# Patient Record
Sex: FEMALE | Race: WHITE | NOT HISPANIC OR LATINO | Employment: OTHER | URBAN - METROPOLITAN AREA
[De-identification: names, ages, dates, MRNs, and addresses within clinical notes are randomized per-mention and may not be internally consistent; named-entity substitution may affect disease eponyms.]

---

## 2018-10-10 ENCOUNTER — HOSPITAL ENCOUNTER (OUTPATIENT)
Dept: ONCOLOGY | Facility: HOSPITAL | Age: 73
Discharge: HOME | End: 2018-10-10
Attending: INTERNAL MEDICINE | Admitting: INTERNAL MEDICINE

## 2018-10-10 ENCOUNTER — HOSPITAL ENCOUNTER (OUTPATIENT)
Dept: ONCOLOGY | Facility: CLINIC | Age: 73
Setting detail: INFUSION SERIES
Discharge: HOME | End: 2018-10-10
Attending: INTERNAL MEDICINE | Admitting: INTERNAL MEDICINE

## 2018-10-10 ENCOUNTER — CLINICAL SUPPORT (OUTPATIENT)
Dept: ONCOLOGY | Facility: HOSPITAL | Age: 73
End: 2018-10-10

## 2018-10-10 LAB
ALBUMIN SERPL-MCNC: 3.9 G/DL (ref 3.5–4.8)
ALBUMIN/GLOB SERPL: 1.3 {RATIO} (ref 1–1.7)
ALP SERPL-CCNC: 83 IU/L (ref 32–91)
ALT SERPL-CCNC: 12 IU/L (ref 14–54)
ANION GAP SERPL CALC-SCNC: 11.1 MMOL/L (ref 10–20)
AST SERPL-CCNC: 11 IU/L (ref 15–41)
BILIRUB SERPL-MCNC: 0.3 MG/DL (ref 0.3–1.2)
BUN SERPL-MCNC: 10 MG/DL (ref 8–20)
BUN/CREAT SERPL: 11.1 (ref 5.4–26.2)
CALCIUM SERPL-MCNC: 9.5 MG/DL (ref 8.9–10.3)
CHLORIDE SERPL-SCNC: 106 MMOL/L (ref 101–111)
CONV CO2: 26 MMOL/L (ref 22–32)
CONV TOTAL PROTEIN: 6.8 G/DL (ref 6.1–7.9)
CORTIS SERPL-MCNC: 2.3 UG/DL
CREAT UR-MCNC: 0.9 MG/DL (ref 0.4–1)
ERYTHROCYTE [SEDIMENTATION RATE] IN BLOOD BY WESTERGREN METHOD: 12 MM/HR (ref 0–30)
FERRITIN SERPL-MCNC: 39 NG/ML (ref 11–307)
GLOBULIN UR ELPH-MCNC: 2.9 G/DL (ref 2.5–3.8)
GLUCOSE SERPL-MCNC: 94 MG/DL (ref 65–99)
LDH SERPL-CCNC: 114 IU/L (ref 98–192)
POTASSIUM SERPL-SCNC: 4.1 MMOL/L (ref 3.6–5.1)
SODIUM SERPL-SCNC: 139 MMOL/L (ref 136–144)
T3FREE SERPL-MCNC: 3.14 PG/ML (ref 2.39–6.79)
T4 FREE SERPL-MCNC: 0.87 NG/DL (ref 0.58–1.64)

## 2018-10-10 NOTE — PROGRESS NOTES
PATIENTS ONCOLOGY RECORD LOCATED IN [facility]      Subjective     Name:  FRANCISCA MCKINLEY     Date:  10/10/2018  Address:  [address] [place] [address] [place] [facility]  Home: [phone]  :  1945 AGE:  73 y.o.        RECORDS OBTAINED:  Patients Oncology Record is located in [facility]

## 2018-10-12 LAB — ANA SER QL IA: POSITIVE

## 2018-10-16 ENCOUNTER — HOSPITAL ENCOUNTER (OUTPATIENT)
Dept: ONCOLOGY | Facility: CLINIC | Age: 73
Setting detail: INFUSION SERIES
Discharge: HOME | End: 2018-10-16
Attending: INTERNAL MEDICINE | Admitting: INTERNAL MEDICINE

## 2018-10-16 ENCOUNTER — CLINICAL SUPPORT (OUTPATIENT)
Dept: ONCOLOGY | Facility: HOSPITAL | Age: 73
End: 2018-10-16

## 2018-10-16 NOTE — PROGRESS NOTES
PATIENTS ONCOLOGY RECORD LOCATED IN [facility]      Subjective     Name:  FRANCISCA MCKINLEY     Date:  10/16/2018  Address:  [address] [place] [address] [place] [facility]  Home: [phone]  :  1945 AGE:  73 y.o.        RECORDS OBTAINED:  Patients Oncology Record is located in [facility]

## 2018-10-17 LAB
ANA PAT SER-IMP: NORMAL
ANA SER QL IA: POSITIVE
ANA TITR SER IF: NORMAL {TITER}
CENTROMERE B AB SER-ACNC: <1 AI
CHROMATIN AB: <1 AI
DSDNA AB SER-ACNC: <1 IU/ML
ENA JO1 AB SER-ACNC: <1 AI
ENA RNP AB SER-ACNC: <1 AI
ENA SCL70 AB SER QL IA: <1 AI
ENA SM AB SER-ACNC: <1 AI
ENA SS-B AB SER-ACNC: <1 AI
INTERPRETATION: NORMAL
RIBOSOMAL P AB SER-ACNC: <1 AI
RIBOSOMAL P AB SER-ACNC: <1 AI
SJOGREN'S ANTI-SS-A: <1 AI

## 2018-11-20 ENCOUNTER — CLINICAL SUPPORT (OUTPATIENT)
Dept: ONCOLOGY | Facility: HOSPITAL | Age: 73
End: 2018-11-20

## 2018-11-20 ENCOUNTER — HOSPITAL ENCOUNTER (OUTPATIENT)
Dept: ONCOLOGY | Facility: CLINIC | Age: 73
Setting detail: INFUSION SERIES
Discharge: HOME | End: 2018-11-20
Attending: INTERNAL MEDICINE | Admitting: INTERNAL MEDICINE

## 2018-11-20 NOTE — PROGRESS NOTES
PATIENTS ONCOLOGY RECORD LOCATED IN [facility]      Subjective     Name:  FRANCISCA MCKINLEY     Date:  2018  Address:  [address] [place] [address] [place] [facility]  Home: [unfilled]  :  1945 AGE:  73 y.o.        RECORDS OBTAINED:  Patients Oncology Record is located in [facility]

## 2018-12-14 ENCOUNTER — HOSPITAL ENCOUNTER (OUTPATIENT)
Dept: ONCOLOGY | Facility: CLINIC | Age: 73
Setting detail: INFUSION SERIES
Discharge: HOME | End: 2018-12-14
Attending: INTERNAL MEDICINE | Admitting: INTERNAL MEDICINE

## 2018-12-14 ENCOUNTER — CLINICAL SUPPORT (OUTPATIENT)
Dept: ONCOLOGY | Facility: HOSPITAL | Age: 73
End: 2018-12-14

## 2018-12-14 NOTE — PROGRESS NOTES
PATIENTS ONCOLOGY RECORD LOCATED IN [facility] [[email or web address]]      Subjective     Name:  FRANCISCA MCKINLEY     Date:  2018  Address:  [address] [place] [address] [place] [facility]  Home: [unfilled]  :  1945 AGE:  73 y.o.        RECORDS OBTAINED:  Patients Oncology Record is located in [facility]

## 2018-12-31 ENCOUNTER — HOSPITAL ENCOUNTER (OUTPATIENT)
Dept: ONCOLOGY | Facility: CLINIC | Age: 73
Setting detail: INFUSION SERIES
Discharge: HOME | End: 2018-12-31
Attending: INTERNAL MEDICINE | Admitting: INTERNAL MEDICINE

## 2018-12-31 ENCOUNTER — CLINICAL SUPPORT (OUTPATIENT)
Dept: ONCOLOGY | Facility: HOSPITAL | Age: 73
End: 2018-12-31

## 2018-12-31 NOTE — PROGRESS NOTES
PATIENTS ONCOLOGY RECORD LOCATED IN [facility]      Subjective     Name:  FRANCISCA MCKINLEY     Date:  2018  Address:  [address] [place] [address] [place] [facility]  Home: [unfilled]  :  1945 AGE:  73 y.o.        RECORDS OBTAINED:  Patients Oncology Record [facility]

## 2022-01-17 ENCOUNTER — HOSPITAL ENCOUNTER (INPATIENT)
Facility: HOSPITAL | Age: 77
LOS: 21 days | Discharge: HOME HEALTH | End: 2022-02-07
Attending: EMERGENCY MEDICINE | Admitting: INTERNAL MEDICINE

## 2022-01-17 ENCOUNTER — APPOINTMENT (OUTPATIENT)
Dept: GENERAL RADIOLOGY | Facility: HOSPITAL | Age: 77
End: 2022-01-17

## 2022-01-17 ENCOUNTER — APPOINTMENT (OUTPATIENT)
Dept: CT IMAGING | Facility: HOSPITAL | Age: 77
End: 2022-01-17

## 2022-01-17 DIAGNOSIS — R42 VERTIGO: ICD-10-CM

## 2022-01-17 DIAGNOSIS — R09.02 HYPOXIA: ICD-10-CM

## 2022-01-17 DIAGNOSIS — J12.82 PNEUMONIA DUE TO COVID-19 VIRUS: Primary | ICD-10-CM

## 2022-01-17 DIAGNOSIS — E86.0 DEHYDRATION: ICD-10-CM

## 2022-01-17 DIAGNOSIS — N39.0 URINARY TRACT INFECTION WITHOUT HEMATURIA, SITE UNSPECIFIED: ICD-10-CM

## 2022-01-17 DIAGNOSIS — U07.1 PNEUMONIA DUE TO COVID-19 VIRUS: Primary | ICD-10-CM

## 2022-01-17 DIAGNOSIS — U07.1 ACUTE HYPOXEMIC RESPIRATORY FAILURE DUE TO COVID-19: ICD-10-CM

## 2022-01-17 DIAGNOSIS — J96.01 ACUTE HYPOXEMIC RESPIRATORY FAILURE DUE TO COVID-19: ICD-10-CM

## 2022-01-17 PROBLEM — K58.9 IBS (IRRITABLE BOWEL SYNDROME): Status: ACTIVE | Noted: 2022-01-17

## 2022-01-17 PROBLEM — J30.89 CHRONIC NONSEASONAL ALLERGIC RHINITIS DUE TO POLLEN: Status: ACTIVE | Noted: 2018-09-18

## 2022-01-17 PROBLEM — D47.1 MPN (MYELOPROLIFERATIVE NEOPLASM) (HCC): Status: ACTIVE | Noted: 2018-11-01

## 2022-01-17 PROBLEM — F41.9 ANXIETY: Status: ACTIVE | Noted: 2022-01-17

## 2022-01-17 PROBLEM — E78.1 HYPERTRIGLYCERIDEMIA: Status: ACTIVE | Noted: 2022-01-17

## 2022-01-17 LAB
ALBUMIN SERPL-MCNC: 3.4 G/DL (ref 3.5–5.2)
ALBUMIN/GLOB SERPL: 1.1 G/DL
ALP SERPL-CCNC: 86 U/L (ref 39–117)
ALT SERPL W P-5'-P-CCNC: 22 U/L (ref 1–33)
ANION GAP SERPL CALCULATED.3IONS-SCNC: 12 MMOL/L (ref 5–15)
AST SERPL-CCNC: 28 U/L (ref 1–32)
BACTERIA UR QL AUTO: ABNORMAL /HPF
BASOPHILS # BLD AUTO: 0 10*3/MM3 (ref 0–0.2)
BASOPHILS NFR BLD AUTO: 0.3 % (ref 0–1.5)
BILIRUB SERPL-MCNC: 0.3 MG/DL (ref 0–1.2)
BILIRUB UR QL STRIP: ABNORMAL
BUN SERPL-MCNC: 12 MG/DL (ref 8–23)
BUN/CREAT SERPL: 18.5 (ref 7–25)
CALCIUM SPEC-SCNC: 8.6 MG/DL (ref 8.6–10.5)
CHLORIDE SERPL-SCNC: 100 MMOL/L (ref 98–107)
CLARITY UR: ABNORMAL
CO2 SERPL-SCNC: 23 MMOL/L (ref 22–29)
COLOR UR: YELLOW
CREAT SERPL-MCNC: 0.65 MG/DL (ref 0.57–1)
CRP SERPL-MCNC: 2.42 MG/DL (ref 0–0.5)
D DIMER PPP FEU-MCNC: 1.21 MG/L (FEU) (ref 0–0.59)
DEPRECATED RDW RBC AUTO: 52.5 FL (ref 37–54)
EOSINOPHIL # BLD AUTO: 0 10*3/MM3 (ref 0–0.4)
EOSINOPHIL NFR BLD AUTO: 0 % (ref 0.3–6.2)
ERYTHROCYTE [DISTWIDTH] IN BLOOD BY AUTOMATED COUNT: 17.4 % (ref 12.3–15.4)
FERRITIN SERPL-MCNC: 176.6 NG/ML (ref 13–150)
GFR SERPL CREATININE-BSD FRML MDRD: 88 ML/MIN/1.73
GLOBULIN UR ELPH-MCNC: 3 GM/DL
GLUCOSE SERPL-MCNC: 115 MG/DL (ref 65–99)
GLUCOSE UR STRIP-MCNC: NEGATIVE MG/DL
HCT VFR BLD AUTO: 42.3 % (ref 34–46.6)
HGB BLD-MCNC: 14.7 G/DL (ref 12–15.9)
HGB UR QL STRIP.AUTO: ABNORMAL
HOLD SPECIMEN: NORMAL
HOLD SPECIMEN: NORMAL
HYALINE CASTS UR QL AUTO: ABNORMAL /LPF
KETONES UR QL STRIP: ABNORMAL
LDH SERPL-CCNC: 264 U/L (ref 135–214)
LEUKOCYTE ESTERASE UR QL STRIP.AUTO: ABNORMAL
LYMPHOCYTES # BLD AUTO: 0.6 10*3/MM3 (ref 0.7–3.1)
LYMPHOCYTES NFR BLD AUTO: 15.5 % (ref 19.6–45.3)
MAGNESIUM SERPL-MCNC: 2.1 MG/DL (ref 1.6–2.4)
MCH RBC QN AUTO: 30 PG (ref 26.6–33)
MCHC RBC AUTO-ENTMCNC: 34.7 G/DL (ref 31.5–35.7)
MCV RBC AUTO: 86.5 FL (ref 79–97)
MONOCYTES # BLD AUTO: 0.3 10*3/MM3 (ref 0.1–0.9)
MONOCYTES NFR BLD AUTO: 7.5 % (ref 5–12)
NEUTROPHILS NFR BLD AUTO: 3.2 10*3/MM3 (ref 1.7–7)
NEUTROPHILS NFR BLD AUTO: 76.7 % (ref 42.7–76)
NITRITE UR QL STRIP: NEGATIVE
NRBC BLD AUTO-RTO: 0.1 /100 WBC (ref 0–0.2)
PH UR STRIP.AUTO: 5.5 [PH] (ref 5–8)
PLATELET # BLD AUTO: 254 10*3/MM3 (ref 140–450)
PMV BLD AUTO: 9.1 FL (ref 6–12)
POTASSIUM SERPL-SCNC: 4.2 MMOL/L (ref 3.5–5.2)
PROCALCITONIN SERPL-MCNC: 0.05 NG/ML (ref 0–0.25)
PROT SERPL-MCNC: 6.4 G/DL (ref 6–8.5)
PROT UR QL STRIP: ABNORMAL
RBC # BLD AUTO: 4.89 10*6/MM3 (ref 3.77–5.28)
RBC # UR STRIP: ABNORMAL /HPF
REF LAB TEST METHOD: ABNORMAL
SARS-COV-2 RNA PNL SPEC NAA+PROBE: DETECTED
SODIUM SERPL-SCNC: 135 MMOL/L (ref 136–145)
SP GR UR STRIP: 1.03 (ref 1–1.03)
SQUAMOUS #/AREA URNS HPF: ABNORMAL /HPF
TROPONIN T SERPL-MCNC: <0.01 NG/ML (ref 0–0.03)
TSH SERPL DL<=0.05 MIU/L-ACNC: 2.55 UIU/ML (ref 0.27–4.2)
UROBILINOGEN UR QL STRIP: ABNORMAL
WBC # UR STRIP: ABNORMAL /HPF
WBC NRBC COR # BLD: 4.1 10*3/MM3 (ref 3.4–10.8)
WHOLE BLOOD HOLD SPECIMEN: NORMAL
WHOLE BLOOD HOLD SPECIMEN: NORMAL

## 2022-01-17 PROCEDURE — 83615 LACTATE (LD) (LDH) ENZYME: CPT | Performed by: NURSE PRACTITIONER

## 2022-01-17 PROCEDURE — 25010000002 ONDANSETRON PER 1 MG: Performed by: EMERGENCY MEDICINE

## 2022-01-17 PROCEDURE — 85379 FIBRIN DEGRADATION QUANT: CPT | Performed by: NURSE PRACTITIONER

## 2022-01-17 PROCEDURE — 82728 ASSAY OF FERRITIN: CPT | Performed by: NURSE PRACTITIONER

## 2022-01-17 PROCEDURE — 86140 C-REACTIVE PROTEIN: CPT | Performed by: NURSE PRACTITIONER

## 2022-01-17 PROCEDURE — 70450 CT HEAD/BRAIN W/O DYE: CPT

## 2022-01-17 PROCEDURE — 84484 ASSAY OF TROPONIN QUANT: CPT | Performed by: EMERGENCY MEDICINE

## 2022-01-17 PROCEDURE — 83735 ASSAY OF MAGNESIUM: CPT | Performed by: EMERGENCY MEDICINE

## 2022-01-17 PROCEDURE — 84443 ASSAY THYROID STIM HORMONE: CPT | Performed by: EMERGENCY MEDICINE

## 2022-01-17 PROCEDURE — 99222 1ST HOSP IP/OBS MODERATE 55: CPT | Performed by: NURSE PRACTITIONER

## 2022-01-17 PROCEDURE — P9612 CATHETERIZE FOR URINE SPEC: HCPCS

## 2022-01-17 PROCEDURE — 93005 ELECTROCARDIOGRAM TRACING: CPT

## 2022-01-17 PROCEDURE — 71275 CT ANGIOGRAPHY CHEST: CPT

## 2022-01-17 PROCEDURE — 87086 URINE CULTURE/COLONY COUNT: CPT | Performed by: EMERGENCY MEDICINE

## 2022-01-17 PROCEDURE — 87147 CULTURE TYPE IMMUNOLOGIC: CPT | Performed by: EMERGENCY MEDICINE

## 2022-01-17 PROCEDURE — 25010000002 DEXAMETHASONE PER 1 MG: Performed by: NURSE PRACTITIONER

## 2022-01-17 PROCEDURE — 87635 SARS-COV-2 COVID-19 AMP PRB: CPT | Performed by: EMERGENCY MEDICINE

## 2022-01-17 PROCEDURE — 84484 ASSAY OF TROPONIN QUANT: CPT | Performed by: NURSE PRACTITIONER

## 2022-01-17 PROCEDURE — 0 IOPAMIDOL PER 1 ML: Performed by: INTERNAL MEDICINE

## 2022-01-17 PROCEDURE — 93005 ELECTROCARDIOGRAM TRACING: CPT | Performed by: EMERGENCY MEDICINE

## 2022-01-17 PROCEDURE — 25010000002 CEFTRIAXONE PER 250 MG: Performed by: EMERGENCY MEDICINE

## 2022-01-17 PROCEDURE — 80053 COMPREHEN METABOLIC PANEL: CPT | Performed by: EMERGENCY MEDICINE

## 2022-01-17 PROCEDURE — 81001 URINALYSIS AUTO W/SCOPE: CPT | Performed by: EMERGENCY MEDICINE

## 2022-01-17 PROCEDURE — 71045 X-RAY EXAM CHEST 1 VIEW: CPT

## 2022-01-17 PROCEDURE — 25010000002 ENOXAPARIN PER 10 MG: Performed by: NURSE PRACTITIONER

## 2022-01-17 PROCEDURE — 87040 BLOOD CULTURE FOR BACTERIA: CPT | Performed by: NURSE PRACTITIONER

## 2022-01-17 PROCEDURE — 36415 COLL VENOUS BLD VENIPUNCTURE: CPT | Performed by: NURSE PRACTITIONER

## 2022-01-17 PROCEDURE — 85025 COMPLETE CBC W/AUTO DIFF WBC: CPT | Performed by: EMERGENCY MEDICINE

## 2022-01-17 PROCEDURE — 84145 PROCALCITONIN (PCT): CPT | Performed by: NURSE PRACTITIONER

## 2022-01-17 PROCEDURE — 99285 EMERGENCY DEPT VISIT HI MDM: CPT

## 2022-01-17 RX ORDER — BISACODYL 5 MG/1
5 TABLET, DELAYED RELEASE ORAL DAILY PRN
Status: DISCONTINUED | OUTPATIENT
Start: 2022-01-17 | End: 2022-02-07 | Stop reason: HOSPADM

## 2022-01-17 RX ORDER — POLYETHYLENE GLYCOL 3350 17 G/17G
17 POWDER, FOR SOLUTION ORAL DAILY PRN
Status: DISCONTINUED | OUTPATIENT
Start: 2022-01-17 | End: 2022-02-07 | Stop reason: HOSPADM

## 2022-01-17 RX ORDER — PANTOPRAZOLE SODIUM 40 MG/1
40 TABLET, DELAYED RELEASE ORAL EVERY MORNING
Status: DISCONTINUED | OUTPATIENT
Start: 2022-01-18 | End: 2022-02-07 | Stop reason: HOSPADM

## 2022-01-17 RX ORDER — ALBUTEROL SULFATE 90 UG/1
2 AEROSOL, METERED RESPIRATORY (INHALATION) EVERY 6 HOURS PRN
Status: DISCONTINUED | OUTPATIENT
Start: 2022-01-17 | End: 2022-02-07 | Stop reason: HOSPADM

## 2022-01-17 RX ORDER — OMEPRAZOLE 40 MG/1
40 CAPSULE, DELAYED RELEASE ORAL DAILY
COMMUNITY

## 2022-01-17 RX ORDER — ONDANSETRON 2 MG/ML
4 INJECTION INTRAMUSCULAR; INTRAVENOUS EVERY 6 HOURS PRN
Status: DISCONTINUED | OUTPATIENT
Start: 2022-01-17 | End: 2022-02-07 | Stop reason: HOSPADM

## 2022-01-17 RX ORDER — ACETAMINOPHEN 650 MG/1
650 SUPPOSITORY RECTAL EVERY 4 HOURS PRN
Status: DISCONTINUED | OUTPATIENT
Start: 2022-01-17 | End: 2022-02-07 | Stop reason: HOSPADM

## 2022-01-17 RX ORDER — ACETAMINOPHEN 160 MG/5ML
650 SOLUTION ORAL EVERY 4 HOURS PRN
Status: DISCONTINUED | OUTPATIENT
Start: 2022-01-17 | End: 2022-02-07 | Stop reason: HOSPADM

## 2022-01-17 RX ORDER — ASPIRIN 325 MG
325 TABLET ORAL DAILY
COMMUNITY

## 2022-01-17 RX ORDER — AMOXICILLIN 250 MG
2 CAPSULE ORAL 2 TIMES DAILY
Status: DISCONTINUED | OUTPATIENT
Start: 2022-01-17 | End: 2022-02-07 | Stop reason: HOSPADM

## 2022-01-17 RX ORDER — MECLIZINE HYDROCHLORIDE 25 MG/1
25 TABLET ORAL 3 TIMES DAILY PRN
Status: DISCONTINUED | OUTPATIENT
Start: 2022-01-17 | End: 2022-02-04

## 2022-01-17 RX ORDER — SODIUM CHLORIDE 0.9 % (FLUSH) 0.9 %
10 SYRINGE (ML) INJECTION EVERY 12 HOURS SCHEDULED
Status: DISCONTINUED | OUTPATIENT
Start: 2022-01-17 | End: 2022-02-07 | Stop reason: HOSPADM

## 2022-01-17 RX ORDER — BISACODYL 10 MG
10 SUPPOSITORY, RECTAL RECTAL DAILY PRN
Status: DISCONTINUED | OUTPATIENT
Start: 2022-01-17 | End: 2022-02-07 | Stop reason: HOSPADM

## 2022-01-17 RX ORDER — ONDANSETRON 4 MG/1
4 TABLET, FILM COATED ORAL EVERY 6 HOURS PRN
Status: DISCONTINUED | OUTPATIENT
Start: 2022-01-17 | End: 2022-02-07 | Stop reason: HOSPADM

## 2022-01-17 RX ORDER — MECLIZINE HYDROCHLORIDE 25 MG/1
25 TABLET ORAL ONCE
Status: COMPLETED | OUTPATIENT
Start: 2022-01-17 | End: 2022-01-17

## 2022-01-17 RX ORDER — POTASSIUM CHLORIDE 1.5 G/1.77G
40 POWDER, FOR SOLUTION ORAL AS NEEDED
Status: DISCONTINUED | OUTPATIENT
Start: 2022-01-17 | End: 2022-02-07 | Stop reason: HOSPADM

## 2022-01-17 RX ORDER — SODIUM CHLORIDE 0.9 % (FLUSH) 0.9 %
10 SYRINGE (ML) INJECTION AS NEEDED
Status: DISCONTINUED | OUTPATIENT
Start: 2022-01-17 | End: 2022-02-07 | Stop reason: HOSPADM

## 2022-01-17 RX ORDER — ONDANSETRON 2 MG/ML
4 INJECTION INTRAMUSCULAR; INTRAVENOUS ONCE
Status: COMPLETED | OUTPATIENT
Start: 2022-01-17 | End: 2022-01-17

## 2022-01-17 RX ORDER — CHOLECALCIFEROL (VITAMIN D3) 125 MCG
5 CAPSULE ORAL NIGHTLY PRN
Status: DISCONTINUED | OUTPATIENT
Start: 2022-01-17 | End: 2022-02-07 | Stop reason: HOSPADM

## 2022-01-17 RX ORDER — ALUMINA, MAGNESIA, AND SIMETHICONE 2400; 2400; 240 MG/30ML; MG/30ML; MG/30ML
15 SUSPENSION ORAL EVERY 6 HOURS PRN
Status: DISCONTINUED | OUTPATIENT
Start: 2022-01-17 | End: 2022-02-07 | Stop reason: HOSPADM

## 2022-01-17 RX ORDER — DEXAMETHASONE SODIUM PHOSPHATE 4 MG/ML
6 INJECTION, SOLUTION INTRA-ARTICULAR; INTRALESIONAL; INTRAMUSCULAR; INTRAVENOUS; SOFT TISSUE DAILY
Status: DISCONTINUED | OUTPATIENT
Start: 2022-01-17 | End: 2022-01-18

## 2022-01-17 RX ORDER — NITROGLYCERIN 0.4 MG/1
0.4 TABLET SUBLINGUAL
Status: DISCONTINUED | OUTPATIENT
Start: 2022-01-17 | End: 2022-02-07 | Stop reason: HOSPADM

## 2022-01-17 RX ORDER — MAGNESIUM SULFATE 1 G/100ML
1 INJECTION INTRAVENOUS AS NEEDED
Status: DISCONTINUED | OUTPATIENT
Start: 2022-01-17 | End: 2022-02-07 | Stop reason: HOSPADM

## 2022-01-17 RX ORDER — ACETAMINOPHEN 325 MG/1
650 TABLET ORAL EVERY 4 HOURS PRN
Status: DISCONTINUED | OUTPATIENT
Start: 2022-01-17 | End: 2022-02-07 | Stop reason: HOSPADM

## 2022-01-17 RX ORDER — KETOROLAC TROMETHAMINE 30 MG/ML
15 INJECTION, SOLUTION INTRAMUSCULAR; INTRAVENOUS EVERY 6 HOURS PRN
Status: DISPENSED | OUTPATIENT
Start: 2022-01-17 | End: 2022-01-21

## 2022-01-17 RX ORDER — DEXAMETHASONE 6 MG/1
6 TABLET ORAL DAILY
Status: DISCONTINUED | OUTPATIENT
Start: 2022-01-17 | End: 2022-01-23

## 2022-01-17 RX ORDER — BENZONATATE 100 MG/1
100 CAPSULE ORAL 3 TIMES DAILY PRN
Status: DISCONTINUED | OUTPATIENT
Start: 2022-01-17 | End: 2022-02-07 | Stop reason: HOSPADM

## 2022-01-17 RX ORDER — SODIUM CHLORIDE, SODIUM LACTATE, POTASSIUM CHLORIDE, CALCIUM CHLORIDE 600; 310; 30; 20 MG/100ML; MG/100ML; MG/100ML; MG/100ML
125 INJECTION, SOLUTION INTRAVENOUS CONTINUOUS
Status: DISCONTINUED | OUTPATIENT
Start: 2022-01-17 | End: 2022-01-22

## 2022-01-17 RX ORDER — MAGNESIUM SULFATE HEPTAHYDRATE 40 MG/ML
2 INJECTION, SOLUTION INTRAVENOUS AS NEEDED
Status: DISCONTINUED | OUTPATIENT
Start: 2022-01-17 | End: 2022-02-07 | Stop reason: HOSPADM

## 2022-01-17 RX ORDER — POTASSIUM CHLORIDE 20 MEQ/1
40 TABLET, EXTENDED RELEASE ORAL AS NEEDED
Status: DISCONTINUED | OUTPATIENT
Start: 2022-01-17 | End: 2022-02-07 | Stop reason: HOSPADM

## 2022-01-17 RX ADMIN — Medication 5 MG: at 20:08

## 2022-01-17 RX ADMIN — ENOXAPARIN SODIUM 40 MG: 40 INJECTION SUBCUTANEOUS at 20:08

## 2022-01-17 RX ADMIN — SODIUM CHLORIDE, PRESERVATIVE FREE 10 ML: 5 INJECTION INTRAVENOUS at 20:09

## 2022-01-17 RX ADMIN — MECLIZINE HYDROCHLORIDE 25 MG: 25 TABLET ORAL at 20:08

## 2022-01-17 RX ADMIN — SODIUM CHLORIDE, POTASSIUM CHLORIDE, SODIUM LACTATE AND CALCIUM CHLORIDE 500 ML: 600; 310; 30; 20 INJECTION, SOLUTION INTRAVENOUS at 10:21

## 2022-01-17 RX ADMIN — DEXAMETHASONE SODIUM PHOSPHATE 6 MG: 4 INJECTION, SOLUTION INTRA-ARTICULAR; INTRALESIONAL; INTRAMUSCULAR; INTRAVENOUS; SOFT TISSUE at 11:32

## 2022-01-17 RX ADMIN — MECLIZINE HYDROCHLORIDE 25 MG: 25 TABLET ORAL at 08:18

## 2022-01-17 RX ADMIN — ONDANSETRON 4 MG: 2 INJECTION INTRAMUSCULAR; INTRAVENOUS at 10:21

## 2022-01-17 RX ADMIN — IOPAMIDOL 100 ML: 755 INJECTION, SOLUTION INTRAVENOUS at 12:59

## 2022-01-17 RX ADMIN — ONDANSETRON 4 MG: 2 INJECTION INTRAMUSCULAR; INTRAVENOUS at 08:18

## 2022-01-17 RX ADMIN — CEFTRIAXONE 1 G: 10 INJECTION, POWDER, FOR SOLUTION INTRAVENOUS at 10:21

## 2022-01-17 RX ADMIN — SODIUM CHLORIDE 1000 ML: 9 INJECTION, SOLUTION INTRAVENOUS at 08:18

## 2022-01-17 RX ADMIN — SODIUM CHLORIDE, POTASSIUM CHLORIDE, SODIUM LACTATE AND CALCIUM CHLORIDE 100 ML/HR: 600; 310; 30; 20 INJECTION, SOLUTION INTRAVENOUS at 11:32

## 2022-01-17 RX ADMIN — DOCUSATE SODIUM 50 MG AND SENNOSIDES 8.6 MG 2 TABLET: 8.6; 5 TABLET, FILM COATED ORAL at 20:08

## 2022-01-18 LAB
ALBUMIN SERPL-MCNC: 2.9 G/DL (ref 3.5–5.2)
ALBUMIN/GLOB SERPL: 1.1 G/DL
ALP SERPL-CCNC: 75 U/L (ref 39–117)
ALT SERPL W P-5'-P-CCNC: 17 U/L (ref 1–33)
ANION GAP SERPL CALCULATED.3IONS-SCNC: 9 MMOL/L (ref 5–15)
ANISOCYTOSIS BLD QL: ABNORMAL
AST SERPL-CCNC: 23 U/L (ref 1–32)
BACTERIA SPEC AEROBE CULT: ABNORMAL
BILIRUB SERPL-MCNC: 0.3 MG/DL (ref 0–1.2)
BUN SERPL-MCNC: 10 MG/DL (ref 8–23)
BUN/CREAT SERPL: 18.5 (ref 7–25)
BURR CELLS BLD QL SMEAR: ABNORMAL
CALCIUM SPEC-SCNC: 8.5 MG/DL (ref 8.6–10.5)
CHLORIDE SERPL-SCNC: 106 MMOL/L (ref 98–107)
CO2 SERPL-SCNC: 24 MMOL/L (ref 22–29)
CREAT SERPL-MCNC: 0.54 MG/DL (ref 0.57–1)
CRP SERPL-MCNC: 2.07 MG/DL (ref 0–0.5)
DEPRECATED RDW RBC AUTO: 53.8 FL (ref 37–54)
ERYTHROCYTE [DISTWIDTH] IN BLOOD BY AUTOMATED COUNT: 17.9 % (ref 12.3–15.4)
GFR SERPL CREATININE-BSD FRML MDRD: 109 ML/MIN/1.73
GIANT PLATELETS: ABNORMAL
GLOBULIN UR ELPH-MCNC: 2.6 GM/DL
GLUCOSE SERPL-MCNC: 87 MG/DL (ref 65–99)
HCT VFR BLD AUTO: 40.6 % (ref 34–46.6)
HGB BLD-MCNC: 13.8 G/DL (ref 12–15.9)
LARGE PLATELETS: ABNORMAL
LYMPHOCYTES # BLD MANUAL: 1.17 10*3/MM3 (ref 0.7–3.1)
LYMPHOCYTES NFR BLD MANUAL: 11 % (ref 5–12)
MAGNESIUM SERPL-MCNC: 1.8 MG/DL (ref 1.6–2.4)
MCH RBC QN AUTO: 29.4 PG (ref 26.6–33)
MCHC RBC AUTO-ENTMCNC: 34 G/DL (ref 31.5–35.7)
MCV RBC AUTO: 86.4 FL (ref 79–97)
MICROCYTES BLD QL: ABNORMAL
MONOCYTES # BLD: 0.5 10*3/MM3 (ref 0.1–0.9)
NEUTROPHILS # BLD AUTO: 2.84 10*3/MM3 (ref 1.7–7)
NEUTROPHILS NFR BLD MANUAL: 54 % (ref 42.7–76)
NEUTS BAND NFR BLD MANUAL: 9 % (ref 0–5)
PLATELET # BLD AUTO: 223 10*3/MM3 (ref 140–450)
PMV BLD AUTO: 9.3 FL (ref 6–12)
POIKILOCYTOSIS BLD QL SMEAR: ABNORMAL
POTASSIUM SERPL-SCNC: 4.3 MMOL/L (ref 3.5–5.2)
PROT SERPL-MCNC: 5.5 G/DL (ref 6–8.5)
RBC # BLD AUTO: 4.7 10*6/MM3 (ref 3.77–5.28)
SMALL PLATELETS BLD QL SMEAR: ADEQUATE
SODIUM SERPL-SCNC: 139 MMOL/L (ref 136–145)
VARIANT LYMPHS NFR BLD MANUAL: 22 % (ref 19.6–45.3)
VARIANT LYMPHS NFR BLD MANUAL: 4 % (ref 0–5)
WBC MORPH BLD: NORMAL
WBC NRBC COR # BLD: 4.5 10*3/MM3 (ref 3.4–10.8)

## 2022-01-18 PROCEDURE — 25010000002 CEFTRIAXONE PER 250 MG: Performed by: NURSE PRACTITIONER

## 2022-01-18 PROCEDURE — 99233 SBSQ HOSP IP/OBS HIGH 50: CPT | Performed by: INTERNAL MEDICINE

## 2022-01-18 PROCEDURE — 80053 COMPREHEN METABOLIC PANEL: CPT | Performed by: NURSE PRACTITIONER

## 2022-01-18 PROCEDURE — 25010000002 ENOXAPARIN PER 10 MG: Performed by: NURSE PRACTITIONER

## 2022-01-18 PROCEDURE — 97162 PT EVAL MOD COMPLEX 30 MIN: CPT

## 2022-01-18 PROCEDURE — 25010000002 ONDANSETRON PER 1 MG: Performed by: NURSE PRACTITIONER

## 2022-01-18 PROCEDURE — 85007 BL SMEAR W/DIFF WBC COUNT: CPT | Performed by: NURSE PRACTITIONER

## 2022-01-18 PROCEDURE — 83735 ASSAY OF MAGNESIUM: CPT | Performed by: NURSE PRACTITIONER

## 2022-01-18 PROCEDURE — 85025 COMPLETE CBC W/AUTO DIFF WBC: CPT | Performed by: NURSE PRACTITIONER

## 2022-01-18 PROCEDURE — 86140 C-REACTIVE PROTEIN: CPT | Performed by: NURSE PRACTITIONER

## 2022-01-18 PROCEDURE — 63710000001 DEXAMETHASONE PER 0.25 MG: Performed by: NURSE PRACTITIONER

## 2022-01-18 RX ADMIN — SODIUM CHLORIDE, PRESERVATIVE FREE 10 ML: 5 INJECTION INTRAVENOUS at 20:30

## 2022-01-18 RX ADMIN — MECLIZINE HYDROCHLORIDE 25 MG: 25 TABLET ORAL at 05:33

## 2022-01-18 RX ADMIN — SODIUM CHLORIDE, POTASSIUM CHLORIDE, SODIUM LACTATE AND CALCIUM CHLORIDE 100 ML/HR: 600; 310; 30; 20 INJECTION, SOLUTION INTRAVENOUS at 20:30

## 2022-01-18 RX ADMIN — CEFTRIAXONE 1 G: 1 INJECTION, POWDER, FOR SOLUTION INTRAMUSCULAR; INTRAVENOUS at 08:56

## 2022-01-18 RX ADMIN — ONDANSETRON 4 MG: 2 INJECTION INTRAMUSCULAR; INTRAVENOUS at 17:24

## 2022-01-18 RX ADMIN — ONDANSETRON 4 MG: 2 INJECTION INTRAMUSCULAR; INTRAVENOUS at 12:54

## 2022-01-18 RX ADMIN — PANTOPRAZOLE SODIUM 40 MG: 40 TABLET, DELAYED RELEASE ORAL at 05:33

## 2022-01-18 RX ADMIN — DOCUSATE SODIUM 50 MG AND SENNOSIDES 8.6 MG 2 TABLET: 8.6; 5 TABLET, FILM COATED ORAL at 08:55

## 2022-01-18 RX ADMIN — ACETAMINOPHEN 650 MG: 325 TABLET, FILM COATED ORAL at 05:33

## 2022-01-18 RX ADMIN — DEXAMETHASONE 6 MG: 4 TABLET ORAL at 08:55

## 2022-01-18 RX ADMIN — SODIUM CHLORIDE, POTASSIUM CHLORIDE, SODIUM LACTATE AND CALCIUM CHLORIDE 100 ML/HR: 600; 310; 30; 20 INJECTION, SOLUTION INTRAVENOUS at 09:40

## 2022-01-18 RX ADMIN — SODIUM CHLORIDE, PRESERVATIVE FREE 10 ML: 5 INJECTION INTRAVENOUS at 08:55

## 2022-01-18 RX ADMIN — DOCUSATE SODIUM 50 MG AND SENNOSIDES 8.6 MG 2 TABLET: 8.6; 5 TABLET, FILM COATED ORAL at 20:30

## 2022-01-18 RX ADMIN — MECLIZINE HYDROCHLORIDE 25 MG: 25 TABLET ORAL at 12:54

## 2022-01-18 RX ADMIN — ENOXAPARIN SODIUM 40 MG: 40 INJECTION SUBCUTANEOUS at 15:13

## 2022-01-18 RX ADMIN — ONDANSETRON 4 MG: 2 INJECTION INTRAMUSCULAR; INTRAVENOUS at 05:32

## 2022-01-19 LAB
ALBUMIN SERPL-MCNC: 3 G/DL (ref 3.5–5.2)
ALBUMIN/GLOB SERPL: 1 G/DL
ALP SERPL-CCNC: 77 U/L (ref 39–117)
ALT SERPL W P-5'-P-CCNC: 15 U/L (ref 1–33)
ANION GAP SERPL CALCULATED.3IONS-SCNC: 9 MMOL/L (ref 5–15)
AST SERPL-CCNC: 26 U/L (ref 1–32)
BASOPHILS # BLD AUTO: 0 10*3/MM3 (ref 0–0.2)
BASOPHILS NFR BLD AUTO: 0.8 % (ref 0–1.5)
BILIRUB SERPL-MCNC: 0.3 MG/DL (ref 0–1.2)
BUN SERPL-MCNC: 9 MG/DL (ref 8–23)
BUN/CREAT SERPL: 15.3 (ref 7–25)
CALCIUM SPEC-SCNC: 8.5 MG/DL (ref 8.6–10.5)
CHLORIDE SERPL-SCNC: 100 MMOL/L (ref 98–107)
CO2 SERPL-SCNC: 27 MMOL/L (ref 22–29)
CREAT SERPL-MCNC: 0.59 MG/DL (ref 0.57–1)
CRP SERPL-MCNC: 3.43 MG/DL (ref 0–0.5)
D DIMER PPP FEU-MCNC: 0.9 MG/L (FEU) (ref 0–0.59)
DEPRECATED RDW RBC AUTO: 52.9 FL (ref 37–54)
EOSINOPHIL # BLD AUTO: 0 10*3/MM3 (ref 0–0.4)
EOSINOPHIL NFR BLD AUTO: 0.2 % (ref 0.3–6.2)
ERYTHROCYTE [DISTWIDTH] IN BLOOD BY AUTOMATED COUNT: 17.7 % (ref 12.3–15.4)
GFR SERPL CREATININE-BSD FRML MDRD: 99 ML/MIN/1.73
GLOBULIN UR ELPH-MCNC: 2.9 GM/DL
GLUCOSE SERPL-MCNC: 94 MG/DL (ref 65–99)
HCT VFR BLD AUTO: 41.7 % (ref 34–46.6)
HGB BLD-MCNC: 14 G/DL (ref 12–15.9)
LYMPHOCYTES # BLD AUTO: 1.1 10*3/MM3 (ref 0.7–3.1)
LYMPHOCYTES NFR BLD AUTO: 24 % (ref 19.6–45.3)
MAGNESIUM SERPL-MCNC: 1.6 MG/DL (ref 1.6–2.4)
MCH RBC QN AUTO: 28.7 PG (ref 26.6–33)
MCHC RBC AUTO-ENTMCNC: 33.7 G/DL (ref 31.5–35.7)
MCV RBC AUTO: 85.2 FL (ref 79–97)
MONOCYTES # BLD AUTO: 0.4 10*3/MM3 (ref 0.1–0.9)
MONOCYTES NFR BLD AUTO: 7.8 % (ref 5–12)
NEUTROPHILS NFR BLD AUTO: 3.1 10*3/MM3 (ref 1.7–7)
NEUTROPHILS NFR BLD AUTO: 67.2 % (ref 42.7–76)
NRBC BLD AUTO-RTO: 0.3 /100 WBC (ref 0–0.2)
PHOSPHATE SERPL-MCNC: 2.4 MG/DL (ref 2.5–4.5)
PLATELET # BLD AUTO: 236 10*3/MM3 (ref 140–450)
PMV BLD AUTO: 8.9 FL (ref 6–12)
POTASSIUM SERPL-SCNC: 4 MMOL/L (ref 3.5–5.2)
PROT SERPL-MCNC: 5.9 G/DL (ref 6–8.5)
RBC # BLD AUTO: 4.89 10*6/MM3 (ref 3.77–5.28)
SODIUM SERPL-SCNC: 136 MMOL/L (ref 136–145)
WBC NRBC COR # BLD: 4.6 10*3/MM3 (ref 3.4–10.8)

## 2022-01-19 PROCEDURE — 36415 COLL VENOUS BLD VENIPUNCTURE: CPT | Performed by: NURSE PRACTITIONER

## 2022-01-19 PROCEDURE — 25010000002 CEFTRIAXONE PER 250 MG: Performed by: NURSE PRACTITIONER

## 2022-01-19 PROCEDURE — 63710000001 DEXAMETHASONE PER 0.25 MG: Performed by: NURSE PRACTITIONER

## 2022-01-19 PROCEDURE — 85379 FIBRIN DEGRADATION QUANT: CPT | Performed by: NURSE PRACTITIONER

## 2022-01-19 PROCEDURE — 99232 SBSQ HOSP IP/OBS MODERATE 35: CPT | Performed by: INTERNAL MEDICINE

## 2022-01-19 PROCEDURE — 80053 COMPREHEN METABOLIC PANEL: CPT | Performed by: NURSE PRACTITIONER

## 2022-01-19 PROCEDURE — 85025 COMPLETE CBC W/AUTO DIFF WBC: CPT | Performed by: NURSE PRACTITIONER

## 2022-01-19 PROCEDURE — 86140 C-REACTIVE PROTEIN: CPT | Performed by: NURSE PRACTITIONER

## 2022-01-19 PROCEDURE — 25010000002 ENOXAPARIN PER 10 MG: Performed by: NURSE PRACTITIONER

## 2022-01-19 PROCEDURE — 84100 ASSAY OF PHOSPHORUS: CPT | Performed by: INTERNAL MEDICINE

## 2022-01-19 PROCEDURE — 83735 ASSAY OF MAGNESIUM: CPT | Performed by: NURSE PRACTITIONER

## 2022-01-19 PROCEDURE — 25010000002 ONDANSETRON PER 1 MG: Performed by: NURSE PRACTITIONER

## 2022-01-19 RX ORDER — MIDODRINE HYDROCHLORIDE 5 MG/1
5 TABLET ORAL
Status: DISCONTINUED | OUTPATIENT
Start: 2022-01-19 | End: 2022-01-21

## 2022-01-19 RX ORDER — DIPHENHYDRAMINE HYDROCHLORIDE AND LIDOCAINE HYDROCHLORIDE AND ALUMINUM HYDROXIDE AND MAGNESIUM HYDRO
10 KIT EVERY 6 HOURS
Status: DISCONTINUED | OUTPATIENT
Start: 2022-01-19 | End: 2022-02-07 | Stop reason: HOSPADM

## 2022-01-19 RX ORDER — FENTANYL/ROPIVACAINE/NS/PF 2-625MCG/1
15 PLASTIC BAG, INJECTION (ML) EPIDURAL AS NEEDED
Status: DISCONTINUED | OUTPATIENT
Start: 2022-01-19 | End: 2022-02-07 | Stop reason: HOSPADM

## 2022-01-19 RX ADMIN — SODIUM CHLORIDE, POTASSIUM CHLORIDE, SODIUM LACTATE AND CALCIUM CHLORIDE 100 ML/HR: 600; 310; 30; 20 INJECTION, SOLUTION INTRAVENOUS at 11:52

## 2022-01-19 RX ADMIN — SODIUM CHLORIDE, PRESERVATIVE FREE 10 ML: 5 INJECTION INTRAVENOUS at 09:16

## 2022-01-19 RX ADMIN — ENOXAPARIN SODIUM 40 MG: 40 INJECTION SUBCUTANEOUS at 14:29

## 2022-01-19 RX ADMIN — MIDODRINE HYDROCHLORIDE 5 MG: 5 TABLET ORAL at 17:09

## 2022-01-19 RX ADMIN — ACETAMINOPHEN 650 MG: 325 TABLET, FILM COATED ORAL at 05:31

## 2022-01-19 RX ADMIN — DEXAMETHASONE 6 MG: 4 TABLET ORAL at 09:17

## 2022-01-19 RX ADMIN — DOCUSATE SODIUM 50 MG AND SENNOSIDES 8.6 MG 2 TABLET: 8.6; 5 TABLET, FILM COATED ORAL at 09:16

## 2022-01-19 RX ADMIN — DIPHENHYDRAMINE HYDROCHLORIDE AND LIDOCAINE HYDROCHLORIDE AND ALUMINUM HYDROXIDE AND MAGNESIUM HYDRO 10 ML: KIT at 21:30

## 2022-01-19 RX ADMIN — PANTOPRAZOLE SODIUM 40 MG: 40 TABLET, DELAYED RELEASE ORAL at 05:31

## 2022-01-19 RX ADMIN — SODIUM CHLORIDE, PRESERVATIVE FREE 10 ML: 5 INJECTION INTRAVENOUS at 21:31

## 2022-01-19 RX ADMIN — DIPHENHYDRAMINE HYDROCHLORIDE AND LIDOCAINE HYDROCHLORIDE AND ALUMINUM HYDROXIDE AND MAGNESIUM HYDRO 10 ML: KIT at 16:25

## 2022-01-19 RX ADMIN — CEFTRIAXONE 1 G: 1 INJECTION, POWDER, FOR SOLUTION INTRAMUSCULAR; INTRAVENOUS at 09:16

## 2022-01-19 RX ADMIN — DOCUSATE SODIUM 50 MG AND SENNOSIDES 8.6 MG 2 TABLET: 8.6; 5 TABLET, FILM COATED ORAL at 21:30

## 2022-01-19 RX ADMIN — ONDANSETRON 4 MG: 2 INJECTION INTRAMUSCULAR; INTRAVENOUS at 14:29

## 2022-01-19 RX ADMIN — POTASSIUM PHOSPHATE, MONOBASIC AND POTASSIUM PHOSPHATE, DIBASIC 15 MMOL: 224; 236 INJECTION, SOLUTION, CONCENTRATE INTRAVENOUS at 14:59

## 2022-01-20 LAB
ALBUMIN SERPL-MCNC: 2.8 G/DL (ref 3.5–5.2)
ALBUMIN/GLOB SERPL: 1 G/DL
ALP SERPL-CCNC: 70 U/L (ref 39–117)
ALT SERPL W P-5'-P-CCNC: 14 U/L (ref 1–33)
ANION GAP SERPL CALCULATED.3IONS-SCNC: 12 MMOL/L (ref 5–15)
AST SERPL-CCNC: 24 U/L (ref 1–32)
BASOPHILS # BLD AUTO: 0 10*3/MM3 (ref 0–0.2)
BASOPHILS NFR BLD AUTO: 0.5 % (ref 0–1.5)
BILIRUB SERPL-MCNC: 0.3 MG/DL (ref 0–1.2)
BUN SERPL-MCNC: 10 MG/DL (ref 8–23)
BUN/CREAT SERPL: 16.7 (ref 7–25)
CALCIUM SPEC-SCNC: 8.3 MG/DL (ref 8.6–10.5)
CHLORIDE SERPL-SCNC: 98 MMOL/L (ref 98–107)
CO2 SERPL-SCNC: 27 MMOL/L (ref 22–29)
CREAT SERPL-MCNC: 0.6 MG/DL (ref 0.57–1)
CRP SERPL-MCNC: 6.17 MG/DL (ref 0–0.5)
DEPRECATED RDW RBC AUTO: 54.3 FL (ref 37–54)
EOSINOPHIL # BLD AUTO: 0 10*3/MM3 (ref 0–0.4)
EOSINOPHIL NFR BLD AUTO: 0 % (ref 0.3–6.2)
ERYTHROCYTE [DISTWIDTH] IN BLOOD BY AUTOMATED COUNT: 18.1 % (ref 12.3–15.4)
GFR SERPL CREATININE-BSD FRML MDRD: 97 ML/MIN/1.73
GLOBULIN UR ELPH-MCNC: 2.9 GM/DL
GLUCOSE SERPL-MCNC: 79 MG/DL (ref 65–99)
HCT VFR BLD AUTO: 40.8 % (ref 34–46.6)
HGB BLD-MCNC: 13.7 G/DL (ref 12–15.9)
LYMPHOCYTES # BLD AUTO: 1 10*3/MM3 (ref 0.7–3.1)
LYMPHOCYTES NFR BLD AUTO: 21.6 % (ref 19.6–45.3)
MAGNESIUM SERPL-MCNC: 1.7 MG/DL (ref 1.6–2.4)
MCH RBC QN AUTO: 28.6 PG (ref 26.6–33)
MCHC RBC AUTO-ENTMCNC: 33.6 G/DL (ref 31.5–35.7)
MCV RBC AUTO: 85.1 FL (ref 79–97)
MONOCYTES # BLD AUTO: 0.3 10*3/MM3 (ref 0.1–0.9)
MONOCYTES NFR BLD AUTO: 7.1 % (ref 5–12)
NEUTROPHILS NFR BLD AUTO: 3.2 10*3/MM3 (ref 1.7–7)
NEUTROPHILS NFR BLD AUTO: 70.8 % (ref 42.7–76)
NRBC BLD AUTO-RTO: 0.2 /100 WBC (ref 0–0.2)
PHOSPHATE SERPL-MCNC: 2 MG/DL (ref 2.5–4.5)
PLATELET # BLD AUTO: 218 10*3/MM3 (ref 140–450)
PMV BLD AUTO: 8.9 FL (ref 6–12)
POTASSIUM SERPL-SCNC: 3.7 MMOL/L (ref 3.5–5.2)
PROT SERPL-MCNC: 5.7 G/DL (ref 6–8.5)
QT INTERVAL: 372 MS
RBC # BLD AUTO: 4.8 10*6/MM3 (ref 3.77–5.28)
SODIUM SERPL-SCNC: 137 MMOL/L (ref 136–145)
WBC NRBC COR # BLD: 4.5 10*3/MM3 (ref 3.4–10.8)

## 2022-01-20 PROCEDURE — 25010000002 ENOXAPARIN PER 10 MG: Performed by: NURSE PRACTITIONER

## 2022-01-20 PROCEDURE — 85025 COMPLETE CBC W/AUTO DIFF WBC: CPT | Performed by: NURSE PRACTITIONER

## 2022-01-20 PROCEDURE — 80053 COMPREHEN METABOLIC PANEL: CPT | Performed by: NURSE PRACTITIONER

## 2022-01-20 PROCEDURE — 99232 SBSQ HOSP IP/OBS MODERATE 35: CPT | Performed by: INTERNAL MEDICINE

## 2022-01-20 PROCEDURE — 84100 ASSAY OF PHOSPHORUS: CPT | Performed by: INTERNAL MEDICINE

## 2022-01-20 PROCEDURE — 25010000002 KETOROLAC TROMETHAMINE PER 15 MG: Performed by: NURSE PRACTITIONER

## 2022-01-20 PROCEDURE — 86140 C-REACTIVE PROTEIN: CPT | Performed by: NURSE PRACTITIONER

## 2022-01-20 PROCEDURE — 25010000002 ONDANSETRON PER 1 MG: Performed by: NURSE PRACTITIONER

## 2022-01-20 PROCEDURE — 63710000001 DEXAMETHASONE PER 0.25 MG: Performed by: NURSE PRACTITIONER

## 2022-01-20 PROCEDURE — 36415 COLL VENOUS BLD VENIPUNCTURE: CPT | Performed by: NURSE PRACTITIONER

## 2022-01-20 PROCEDURE — 83735 ASSAY OF MAGNESIUM: CPT | Performed by: NURSE PRACTITIONER

## 2022-01-20 RX ADMIN — SODIUM CHLORIDE, PRESERVATIVE FREE 10 ML: 5 INJECTION INTRAVENOUS at 09:33

## 2022-01-20 RX ADMIN — DEXAMETHASONE 6 MG: 4 TABLET ORAL at 09:33

## 2022-01-20 RX ADMIN — MIDODRINE HYDROCHLORIDE 5 MG: 5 TABLET ORAL at 17:04

## 2022-01-20 RX ADMIN — SODIUM CHLORIDE, POTASSIUM CHLORIDE, SODIUM LACTATE AND CALCIUM CHLORIDE 100 ML/HR: 600; 310; 30; 20 INJECTION, SOLUTION INTRAVENOUS at 20:25

## 2022-01-20 RX ADMIN — DOCUSATE SODIUM 50 MG AND SENNOSIDES 8.6 MG 2 TABLET: 8.6; 5 TABLET, FILM COATED ORAL at 09:33

## 2022-01-20 RX ADMIN — DIPHENHYDRAMINE HYDROCHLORIDE AND LIDOCAINE HYDROCHLORIDE AND ALUMINUM HYDROXIDE AND MAGNESIUM HYDRO 10 ML: KIT at 05:14

## 2022-01-20 RX ADMIN — DOCUSATE SODIUM 50 MG AND SENNOSIDES 8.6 MG 2 TABLET: 8.6; 5 TABLET, FILM COATED ORAL at 20:26

## 2022-01-20 RX ADMIN — MIDODRINE HYDROCHLORIDE 5 MG: 5 TABLET ORAL at 09:33

## 2022-01-20 RX ADMIN — SODIUM CHLORIDE, PRESERVATIVE FREE 10 ML: 5 INJECTION INTRAVENOUS at 20:26

## 2022-01-20 RX ADMIN — ENOXAPARIN SODIUM 40 MG: 40 INJECTION SUBCUTANEOUS at 17:04

## 2022-01-20 RX ADMIN — DIPHENHYDRAMINE HYDROCHLORIDE AND LIDOCAINE HYDROCHLORIDE AND ALUMINUM HYDROXIDE AND MAGNESIUM HYDRO 10 ML: KIT at 10:39

## 2022-01-20 RX ADMIN — ACETAMINOPHEN 650 MG: 325 TABLET, FILM COATED ORAL at 10:39

## 2022-01-20 RX ADMIN — Medication 5 MG: at 20:26

## 2022-01-20 RX ADMIN — PANTOPRAZOLE SODIUM 40 MG: 40 TABLET, DELAYED RELEASE ORAL at 05:14

## 2022-01-20 RX ADMIN — ONDANSETRON 4 MG: 2 INJECTION INTRAMUSCULAR; INTRAVENOUS at 23:24

## 2022-01-20 RX ADMIN — POTASSIUM PHOSPHATE, MONOBASIC AND POTASSIUM PHOSPHATE, DIBASIC 15 MMOL: 224; 236 INJECTION, SOLUTION, CONCENTRATE INTRAVENOUS at 10:39

## 2022-01-20 RX ADMIN — KETOROLAC TROMETHAMINE 15 MG: 30 INJECTION, SOLUTION INTRAMUSCULAR; INTRAVENOUS at 23:24

## 2022-01-20 RX ADMIN — DIPHENHYDRAMINE HYDROCHLORIDE AND LIDOCAINE HYDROCHLORIDE AND ALUMINUM HYDROXIDE AND MAGNESIUM HYDRO 10 ML: KIT at 17:04

## 2022-01-20 RX ADMIN — MIDODRINE HYDROCHLORIDE 5 MG: 5 TABLET ORAL at 10:39

## 2022-01-21 ENCOUNTER — APPOINTMENT (OUTPATIENT)
Dept: CARDIOLOGY | Facility: HOSPITAL | Age: 77
End: 2022-01-21

## 2022-01-21 PROBLEM — F43.21 ADJUSTMENT DISORDER WITH DEPRESSED MOOD: Status: ACTIVE | Noted: 2022-01-21

## 2022-01-21 LAB
ALBUMIN SERPL-MCNC: 2.7 G/DL (ref 3.5–5.2)
ALBUMIN/GLOB SERPL: 1 G/DL
ALP SERPL-CCNC: 68 U/L (ref 39–117)
ALT SERPL W P-5'-P-CCNC: 15 U/L (ref 1–33)
ANION GAP SERPL CALCULATED.3IONS-SCNC: 7 MMOL/L (ref 5–15)
ARTERIAL PATENCY WRIST A: POSITIVE
AST SERPL-CCNC: 34 U/L (ref 1–32)
ATMOSPHERIC PRESS: ABNORMAL MM[HG]
BASE EXCESS BLDA CALC-SCNC: 2.8 MMOL/L (ref 0–3)
BASOPHILS # BLD AUTO: 0 10*3/MM3 (ref 0–0.2)
BASOPHILS NFR BLD AUTO: 0.6 % (ref 0–1.5)
BDY SITE: ABNORMAL
BILIRUB SERPL-MCNC: 0.3 MG/DL (ref 0–1.2)
BUN SERPL-MCNC: 13 MG/DL (ref 8–23)
BUN/CREAT SERPL: 20.6 (ref 7–25)
CALCIUM SPEC-SCNC: 8.6 MG/DL (ref 8.6–10.5)
CHLORIDE SERPL-SCNC: 102 MMOL/L (ref 98–107)
CO2 BLDA-SCNC: 26.5 MMOL/L (ref 22–29)
CO2 SERPL-SCNC: 28 MMOL/L (ref 22–29)
CREAT SERPL-MCNC: 0.63 MG/DL (ref 0.57–1)
CRP SERPL-MCNC: 10.06 MG/DL (ref 0–0.5)
D DIMER PPP FEU-MCNC: 0.9 MG/L (FEU) (ref 0–0.59)
DEPRECATED RDW RBC AUTO: 52.5 FL (ref 37–54)
EOSINOPHIL # BLD AUTO: 0 10*3/MM3 (ref 0–0.4)
EOSINOPHIL NFR BLD AUTO: 0 % (ref 0.3–6.2)
ERYTHROCYTE [DISTWIDTH] IN BLOOD BY AUTOMATED COUNT: 17.6 % (ref 12.3–15.4)
GFR SERPL CREATININE-BSD FRML MDRD: 92 ML/MIN/1.73
GLOBULIN UR ELPH-MCNC: 2.8 GM/DL
GLUCOSE SERPL-MCNC: 100 MG/DL (ref 65–99)
HCO3 BLDA-SCNC: 25.5 MMOL/L (ref 21–28)
HCT VFR BLD AUTO: 41.6 % (ref 34–46.6)
HEMODILUTION: NO
HGB BLD-MCNC: 13.9 G/DL (ref 12–15.9)
INHALED O2 CONCENTRATION: <21 %
LYMPHOCYTES # BLD AUTO: 0.9 10*3/MM3 (ref 0.7–3.1)
LYMPHOCYTES NFR BLD AUTO: 20.9 % (ref 19.6–45.3)
MAGNESIUM SERPL-MCNC: 1.9 MG/DL (ref 1.6–2.4)
MCH RBC QN AUTO: 28.4 PG (ref 26.6–33)
MCHC RBC AUTO-ENTMCNC: 33.3 G/DL (ref 31.5–35.7)
MCV RBC AUTO: 85.3 FL (ref 79–97)
MODALITY: ABNORMAL
MONOCYTES # BLD AUTO: 0.3 10*3/MM3 (ref 0.1–0.9)
MONOCYTES NFR BLD AUTO: 6.5 % (ref 5–12)
NEUTROPHILS NFR BLD AUTO: 3 10*3/MM3 (ref 1.7–7)
NEUTROPHILS NFR BLD AUTO: 72 % (ref 42.7–76)
NRBC BLD AUTO-RTO: 0.1 /100 WBC (ref 0–0.2)
PCO2 BLDA: 32.9 MM HG (ref 35–48)
PH BLDA: 7.5 PH UNITS (ref 7.35–7.45)
PLATELET # BLD AUTO: 224 10*3/MM3 (ref 140–450)
PMV BLD AUTO: 8.8 FL (ref 6–12)
PO2 BLDA: 49.4 MM HG (ref 83–108)
POTASSIUM SERPL-SCNC: 4.3 MMOL/L (ref 3.5–5.2)
PROT SERPL-MCNC: 5.5 G/DL (ref 6–8.5)
RBC # BLD AUTO: 4.88 10*6/MM3 (ref 3.77–5.28)
SAO2 % BLDCOA: 88.2 % (ref 94–98)
SODIUM SERPL-SCNC: 137 MMOL/L (ref 136–145)
WBC NRBC COR # BLD: 4.2 10*3/MM3 (ref 3.4–10.8)

## 2022-01-21 PROCEDURE — 93325 DOPPLER ECHO COLOR FLOW MAPG: CPT | Performed by: INTERNAL MEDICINE

## 2022-01-21 PROCEDURE — 25010000002 ENOXAPARIN PER 10 MG: Performed by: NURSE PRACTITIONER

## 2022-01-21 PROCEDURE — 63710000001 DEXAMETHASONE PER 0.25 MG: Performed by: NURSE PRACTITIONER

## 2022-01-21 PROCEDURE — 93308 TTE F-UP OR LMTD: CPT | Performed by: INTERNAL MEDICINE

## 2022-01-21 PROCEDURE — 99233 SBSQ HOSP IP/OBS HIGH 50: CPT | Performed by: INTERNAL MEDICINE

## 2022-01-21 PROCEDURE — 97530 THERAPEUTIC ACTIVITIES: CPT

## 2022-01-21 PROCEDURE — 80053 COMPREHEN METABOLIC PANEL: CPT | Performed by: NURSE PRACTITIONER

## 2022-01-21 PROCEDURE — 25010000002 KETOROLAC TROMETHAMINE PER 15 MG: Performed by: NURSE PRACTITIONER

## 2022-01-21 PROCEDURE — 36600 WITHDRAWAL OF ARTERIAL BLOOD: CPT

## 2022-01-21 PROCEDURE — 82803 BLOOD GASES ANY COMBINATION: CPT

## 2022-01-21 PROCEDURE — 99221 1ST HOSP IP/OBS SF/LOW 40: CPT

## 2022-01-21 PROCEDURE — 85379 FIBRIN DEGRADATION QUANT: CPT | Performed by: NURSE PRACTITIONER

## 2022-01-21 PROCEDURE — 25010000002 ONDANSETRON PER 1 MG: Performed by: NURSE PRACTITIONER

## 2022-01-21 PROCEDURE — 85025 COMPLETE CBC W/AUTO DIFF WBC: CPT | Performed by: NURSE PRACTITIONER

## 2022-01-21 PROCEDURE — 86140 C-REACTIVE PROTEIN: CPT | Performed by: NURSE PRACTITIONER

## 2022-01-21 PROCEDURE — 93325 DOPPLER ECHO COLOR FLOW MAPG: CPT

## 2022-01-21 PROCEDURE — 83735 ASSAY OF MAGNESIUM: CPT | Performed by: NURSE PRACTITIONER

## 2022-01-21 PROCEDURE — 93308 TTE F-UP OR LMTD: CPT

## 2022-01-21 RX ORDER — MIDODRINE HYDROCHLORIDE 5 MG/1
10 TABLET ORAL
Status: DISCONTINUED | OUTPATIENT
Start: 2022-01-22 | End: 2022-02-07 | Stop reason: HOSPADM

## 2022-01-21 RX ORDER — MIRTAZAPINE 15 MG/1
15 TABLET, FILM COATED ORAL NIGHTLY
Status: DISCONTINUED | OUTPATIENT
Start: 2022-01-21 | End: 2022-02-07 | Stop reason: HOSPADM

## 2022-01-21 RX ADMIN — KETOROLAC TROMETHAMINE 15 MG: 30 INJECTION, SOLUTION INTRAMUSCULAR; INTRAVENOUS at 05:42

## 2022-01-21 RX ADMIN — BENZONATATE 100 MG: 100 CAPSULE ORAL at 05:42

## 2022-01-21 RX ADMIN — ENOXAPARIN SODIUM 40 MG: 40 INJECTION SUBCUTANEOUS at 17:01

## 2022-01-21 RX ADMIN — SODIUM CHLORIDE, PRESERVATIVE FREE 10 ML: 5 INJECTION INTRAVENOUS at 08:25

## 2022-01-21 RX ADMIN — DOCUSATE SODIUM 50 MG AND SENNOSIDES 8.6 MG 2 TABLET: 8.6; 5 TABLET, FILM COATED ORAL at 08:25

## 2022-01-21 RX ADMIN — MECLIZINE HYDROCHLORIDE 25 MG: 25 TABLET ORAL at 17:16

## 2022-01-21 RX ADMIN — SODIUM CHLORIDE, POTASSIUM CHLORIDE, SODIUM LACTATE AND CALCIUM CHLORIDE 100 ML/HR: 600; 310; 30; 20 INJECTION, SOLUTION INTRAVENOUS at 08:25

## 2022-01-21 RX ADMIN — ONDANSETRON 4 MG: 2 INJECTION INTRAMUSCULAR; INTRAVENOUS at 17:16

## 2022-01-21 RX ADMIN — MIRTAZAPINE 15 MG: 15 TABLET, FILM COATED ORAL at 20:38

## 2022-01-21 RX ADMIN — DIPHENHYDRAMINE HYDROCHLORIDE AND LIDOCAINE HYDROCHLORIDE AND ALUMINUM HYDROXIDE AND MAGNESIUM HYDRO 10 ML: KIT at 05:41

## 2022-01-21 RX ADMIN — ONDANSETRON 4 MG: 2 INJECTION INTRAMUSCULAR; INTRAVENOUS at 05:42

## 2022-01-21 RX ADMIN — DIPHENHYDRAMINE HYDROCHLORIDE AND LIDOCAINE HYDROCHLORIDE AND ALUMINUM HYDROXIDE AND MAGNESIUM HYDRO 10 ML: KIT at 17:01

## 2022-01-21 RX ADMIN — PANTOPRAZOLE SODIUM 40 MG: 40 TABLET, DELAYED RELEASE ORAL at 05:42

## 2022-01-21 RX ADMIN — DEXAMETHASONE 6 MG: 4 TABLET ORAL at 08:25

## 2022-01-21 RX ADMIN — MIDODRINE HYDROCHLORIDE 5 MG: 5 TABLET ORAL at 08:25

## 2022-01-21 RX ADMIN — MIDODRINE HYDROCHLORIDE 5 MG: 5 TABLET ORAL at 17:01

## 2022-01-21 RX ADMIN — MECLIZINE HYDROCHLORIDE 25 MG: 25 TABLET ORAL at 05:42

## 2022-01-21 RX ADMIN — DIPHENHYDRAMINE HYDROCHLORIDE AND LIDOCAINE HYDROCHLORIDE AND ALUMINUM HYDROXIDE AND MAGNESIUM HYDRO 10 ML: KIT at 10:03

## 2022-01-21 RX ADMIN — MIDODRINE HYDROCHLORIDE 5 MG: 5 TABLET ORAL at 10:33

## 2022-01-21 RX ADMIN — DOCUSATE SODIUM 50 MG AND SENNOSIDES 8.6 MG 2 TABLET: 8.6; 5 TABLET, FILM COATED ORAL at 20:38

## 2022-01-21 RX ADMIN — DIPHENHYDRAMINE HYDROCHLORIDE AND LIDOCAINE HYDROCHLORIDE AND ALUMINUM HYDROXIDE AND MAGNESIUM HYDRO 10 ML: KIT at 21:52

## 2022-01-22 ENCOUNTER — APPOINTMENT (OUTPATIENT)
Dept: GENERAL RADIOLOGY | Facility: HOSPITAL | Age: 77
End: 2022-01-22

## 2022-01-22 LAB
ALBUMIN SERPL-MCNC: 2.3 G/DL (ref 3.5–5.2)
ALBUMIN/GLOB SERPL: 0.9 G/DL
ALP SERPL-CCNC: 57 U/L (ref 39–117)
ALT SERPL W P-5'-P-CCNC: 14 U/L (ref 1–33)
ANION GAP SERPL CALCULATED.3IONS-SCNC: 9 MMOL/L (ref 5–15)
ARTERIAL PATENCY WRIST A: POSITIVE
AST SERPL-CCNC: 30 U/L (ref 1–32)
ATMOSPHERIC PRESS: ABNORMAL MM[HG]
BACTERIA SPEC AEROBE CULT: NORMAL
BACTERIA SPEC AEROBE CULT: NORMAL
BACTERIA UR QL AUTO: ABNORMAL /HPF
BASE EXCESS BLDA CALC-SCNC: 1.9 MMOL/L (ref 0–3)
BASOPHILS # BLD AUTO: 0 10*3/MM3 (ref 0–0.2)
BASOPHILS NFR BLD AUTO: 0.4 % (ref 0–1.5)
BDY SITE: ABNORMAL
BILIRUB SERPL-MCNC: 0.4 MG/DL (ref 0–1.2)
BILIRUB UR QL STRIP: NEGATIVE
BUN SERPL-MCNC: 12 MG/DL (ref 8–23)
BUN/CREAT SERPL: 21.4 (ref 7–25)
CALCIUM SPEC-SCNC: 7.9 MG/DL (ref 8.6–10.5)
CHLORIDE SERPL-SCNC: 101 MMOL/L (ref 98–107)
CLARITY UR: CLEAR
CO2 BLDA-SCNC: 26.2 MMOL/L (ref 22–29)
CO2 SERPL-SCNC: 25 MMOL/L (ref 22–29)
COLOR UR: YELLOW
CREAT SERPL-MCNC: 0.56 MG/DL (ref 0.57–1)
CRP SERPL-MCNC: 10.46 MG/DL (ref 0–0.5)
DEPRECATED RDW RBC AUTO: 52.1 FL (ref 37–54)
EOSINOPHIL # BLD AUTO: 0 10*3/MM3 (ref 0–0.4)
EOSINOPHIL NFR BLD AUTO: 0 % (ref 0.3–6.2)
ERYTHROCYTE [DISTWIDTH] IN BLOOD BY AUTOMATED COUNT: 17.5 % (ref 12.3–15.4)
GFR SERPL CREATININE-BSD FRML MDRD: 105 ML/MIN/1.73
GLOBULIN UR ELPH-MCNC: 2.5 GM/DL
GLUCOSE SERPL-MCNC: 93 MG/DL (ref 65–99)
GLUCOSE UR STRIP-MCNC: NEGATIVE MG/DL
HCO3 BLDA-SCNC: 25.2 MMOL/L (ref 21–28)
HCT VFR BLD AUTO: 38.1 % (ref 34–46.6)
HEMODILUTION: NO
HGB BLD-MCNC: 13 G/DL (ref 12–15.9)
HGB UR QL STRIP.AUTO: ABNORMAL
HYALINE CASTS UR QL AUTO: ABNORMAL /LPF
INHALED O2 CONCENTRATION: 100 %
KETONES UR QL STRIP: ABNORMAL
LEUKOCYTE ESTERASE UR QL STRIP.AUTO: NEGATIVE
LYMPHOCYTES # BLD AUTO: 0.7 10*3/MM3 (ref 0.7–3.1)
LYMPHOCYTES NFR BLD AUTO: 20.9 % (ref 19.6–45.3)
MAGNESIUM SERPL-MCNC: 1.6 MG/DL (ref 1.6–2.4)
MCH RBC QN AUTO: 29.3 PG (ref 26.6–33)
MCHC RBC AUTO-ENTMCNC: 34.1 G/DL (ref 31.5–35.7)
MCV RBC AUTO: 85.9 FL (ref 79–97)
MODALITY: ABNORMAL
MONOCYTES # BLD AUTO: 0.2 10*3/MM3 (ref 0.1–0.9)
MONOCYTES NFR BLD AUTO: 6.2 % (ref 5–12)
MRSA DNA SPEC QL NAA+PROBE: NORMAL
NEUTROPHILS NFR BLD AUTO: 2.6 10*3/MM3 (ref 1.7–7)
NEUTROPHILS NFR BLD AUTO: 72.5 % (ref 42.7–76)
NITRITE UR QL STRIP: NEGATIVE
NRBC BLD AUTO-RTO: 0.2 /100 WBC (ref 0–0.2)
PCO2 BLDA: 34.5 MM HG (ref 35–48)
PH BLDA: 7.47 PH UNITS (ref 7.35–7.45)
PH UR STRIP.AUTO: 7 [PH] (ref 5–8)
PHOSPHATE SERPL-MCNC: 2.3 MG/DL (ref 2.5–4.5)
PLATELET # BLD AUTO: 266 10*3/MM3 (ref 140–450)
PMV BLD AUTO: 8.9 FL (ref 6–12)
PO2 BLDA: 53.2 MM HG (ref 83–108)
POTASSIUM SERPL-SCNC: 3.5 MMOL/L (ref 3.5–5.2)
PROT SERPL-MCNC: 4.8 G/DL (ref 6–8.5)
PROT UR QL STRIP: NEGATIVE
RBC # BLD AUTO: 4.43 10*6/MM3 (ref 3.77–5.28)
RBC # UR STRIP: ABNORMAL /HPF
REF LAB TEST METHOD: ABNORMAL
SAO2 % BLDCOA: 89.5 % (ref 94–98)
SODIUM SERPL-SCNC: 135 MMOL/L (ref 136–145)
SP GR UR STRIP: 1.01 (ref 1–1.03)
SQUAMOUS #/AREA URNS HPF: ABNORMAL /HPF
UROBILINOGEN UR QL STRIP: ABNORMAL
WBC # UR STRIP: ABNORMAL /HPF
WBC NRBC COR # BLD: 3.5 10*3/MM3 (ref 3.4–10.8)

## 2022-01-22 PROCEDURE — 86140 C-REACTIVE PROTEIN: CPT | Performed by: NURSE PRACTITIONER

## 2022-01-22 PROCEDURE — 84100 ASSAY OF PHOSPHORUS: CPT | Performed by: INTERNAL MEDICINE

## 2022-01-22 PROCEDURE — 99233 SBSQ HOSP IP/OBS HIGH 50: CPT | Performed by: INTERNAL MEDICINE

## 2022-01-22 PROCEDURE — 94799 UNLISTED PULMONARY SVC/PX: CPT

## 2022-01-22 PROCEDURE — 83735 ASSAY OF MAGNESIUM: CPT | Performed by: NURSE PRACTITIONER

## 2022-01-22 PROCEDURE — 81001 URINALYSIS AUTO W/SCOPE: CPT | Performed by: INTERNAL MEDICINE

## 2022-01-22 PROCEDURE — 25010000002 ONDANSETRON PER 1 MG: Performed by: NURSE PRACTITIONER

## 2022-01-22 PROCEDURE — 80053 COMPREHEN METABOLIC PANEL: CPT | Performed by: NURSE PRACTITIONER

## 2022-01-22 PROCEDURE — 25010000002 PIPERACILLIN SOD-TAZOBACTAM PER 1 G: Performed by: INTERNAL MEDICINE

## 2022-01-22 PROCEDURE — 36415 COLL VENOUS BLD VENIPUNCTURE: CPT | Performed by: NURSE PRACTITIONER

## 2022-01-22 PROCEDURE — 87641 MR-STAPH DNA AMP PROBE: CPT | Performed by: INTERNAL MEDICINE

## 2022-01-22 PROCEDURE — 99231 SBSQ HOSP IP/OBS SF/LOW 25: CPT

## 2022-01-22 PROCEDURE — 25010000002 ENOXAPARIN PER 10 MG: Performed by: NURSE PRACTITIONER

## 2022-01-22 PROCEDURE — 63710000001 DEXAMETHASONE PER 0.25 MG: Performed by: NURSE PRACTITIONER

## 2022-01-22 PROCEDURE — 63710000001 ONDANSETRON PER 8 MG: Performed by: NURSE PRACTITIONER

## 2022-01-22 PROCEDURE — 85025 COMPLETE CBC W/AUTO DIFF WBC: CPT | Performed by: NURSE PRACTITIONER

## 2022-01-22 PROCEDURE — 71045 X-RAY EXAM CHEST 1 VIEW: CPT

## 2022-01-22 PROCEDURE — 25010000002 MAGNESIUM SULFATE IN D5W 1G/100ML (PREMIX) 1-5 GM/100ML-% SOLUTION: Performed by: NURSE PRACTITIONER

## 2022-01-22 PROCEDURE — 87040 BLOOD CULTURE FOR BACTERIA: CPT | Performed by: INTERNAL MEDICINE

## 2022-01-22 PROCEDURE — 82803 BLOOD GASES ANY COMBINATION: CPT

## 2022-01-22 PROCEDURE — 36600 WITHDRAWAL OF ARTERIAL BLOOD: CPT

## 2022-01-22 RX ADMIN — DEXAMETHASONE 6 MG: 4 TABLET ORAL at 08:38

## 2022-01-22 RX ADMIN — ACETAMINOPHEN 650 MG: 325 TABLET, FILM COATED ORAL at 11:57

## 2022-01-22 RX ADMIN — SODIUM CHLORIDE, PRESERVATIVE FREE 10 ML: 5 INJECTION INTRAVENOUS at 20:58

## 2022-01-22 RX ADMIN — DOCUSATE SODIUM 50 MG AND SENNOSIDES 8.6 MG 2 TABLET: 8.6; 5 TABLET, FILM COATED ORAL at 08:38

## 2022-01-22 RX ADMIN — MECLIZINE HYDROCHLORIDE 25 MG: 25 TABLET ORAL at 08:41

## 2022-01-22 RX ADMIN — ACETAMINOPHEN 650 MG: 325 TABLET, FILM COATED ORAL at 23:49

## 2022-01-22 RX ADMIN — DIPHENHYDRAMINE HYDROCHLORIDE AND LIDOCAINE HYDROCHLORIDE AND ALUMINUM HYDROXIDE AND MAGNESIUM HYDRO 10 ML: KIT at 05:08

## 2022-01-22 RX ADMIN — SODIUM CHLORIDE, PRESERVATIVE FREE 10 ML: 5 INJECTION INTRAVENOUS at 08:38

## 2022-01-22 RX ADMIN — DIPHENHYDRAMINE HYDROCHLORIDE AND LIDOCAINE HYDROCHLORIDE AND ALUMINUM HYDROXIDE AND MAGNESIUM HYDRO 10 ML: KIT at 17:34

## 2022-01-22 RX ADMIN — PIPERACILLIN AND TAZOBACTAM 4.5 G: 4; .5 INJECTION, POWDER, FOR SOLUTION INTRAVENOUS at 17:34

## 2022-01-22 RX ADMIN — ONDANSETRON 4 MG: 2 INJECTION INTRAMUSCULAR; INTRAVENOUS at 11:57

## 2022-01-22 RX ADMIN — ONDANSETRON 4 MG: 2 INJECTION INTRAMUSCULAR; INTRAVENOUS at 17:34

## 2022-01-22 RX ADMIN — ENOXAPARIN SODIUM 40 MG: 40 INJECTION SUBCUTANEOUS at 17:33

## 2022-01-22 RX ADMIN — ONDANSETRON HYDROCHLORIDE 4 MG: 4 TABLET, FILM COATED ORAL at 23:49

## 2022-01-22 RX ADMIN — MIDODRINE HYDROCHLORIDE 10 MG: 5 TABLET ORAL at 17:33

## 2022-01-22 RX ADMIN — DOCUSATE SODIUM 50 MG AND SENNOSIDES 8.6 MG 2 TABLET: 8.6; 5 TABLET, FILM COATED ORAL at 20:58

## 2022-01-22 RX ADMIN — MIRTAZAPINE 15 MG: 15 TABLET, FILM COATED ORAL at 20:58

## 2022-01-22 RX ADMIN — Medication 5 MG: at 23:49

## 2022-01-22 RX ADMIN — PANTOPRAZOLE SODIUM 40 MG: 40 TABLET, DELAYED RELEASE ORAL at 08:38

## 2022-01-22 RX ADMIN — MIDODRINE HYDROCHLORIDE 10 MG: 5 TABLET ORAL at 08:38

## 2022-01-22 RX ADMIN — PIPERACILLIN AND TAZOBACTAM 4.5 G: 4; .5 INJECTION, POWDER, FOR SOLUTION INTRAVENOUS at 23:49

## 2022-01-22 RX ADMIN — MAGNESIUM SULFATE IN DEXTROSE 1 G: 10 INJECTION, SOLUTION INTRAVENOUS at 08:37

## 2022-01-22 RX ADMIN — ACETAMINOPHEN 650 MG: 325 TABLET, FILM COATED ORAL at 00:03

## 2022-01-23 LAB
ANION GAP SERPL CALCULATED.3IONS-SCNC: 6 MMOL/L (ref 5–15)
ARTERIAL PATENCY WRIST A: POSITIVE
ATMOSPHERIC PRESS: ABNORMAL MM[HG]
BASE EXCESS BLDA CALC-SCNC: 2.5 MMOL/L (ref 0–3)
BASOPHILS # BLD AUTO: 0.1 10*3/MM3 (ref 0–0.2)
BASOPHILS NFR BLD AUTO: 1.3 % (ref 0–1.5)
BDY SITE: ABNORMAL
BUN SERPL-MCNC: 15 MG/DL (ref 8–23)
BUN/CREAT SERPL: 25 (ref 7–25)
CALCIUM SPEC-SCNC: 7.8 MG/DL (ref 8.6–10.5)
CHLORIDE SERPL-SCNC: 104 MMOL/L (ref 98–107)
CO2 BLDA-SCNC: 26.4 MMOL/L (ref 22–29)
CO2 SERPL-SCNC: 25 MMOL/L (ref 22–29)
CREAT SERPL-MCNC: 0.6 MG/DL (ref 0.57–1)
CRP SERPL-MCNC: 11.92 MG/DL (ref 0–0.5)
D DIMER PPP FEU-MCNC: 2.01 MG/L (FEU) (ref 0–0.59)
DEPRECATED RDW RBC AUTO: 50.8 FL (ref 37–54)
EOSINOPHIL # BLD AUTO: 0 10*3/MM3 (ref 0–0.4)
EOSINOPHIL NFR BLD AUTO: 0 % (ref 0.3–6.2)
ERYTHROCYTE [DISTWIDTH] IN BLOOD BY AUTOMATED COUNT: 17.4 % (ref 12.3–15.4)
GFR SERPL CREATININE-BSD FRML MDRD: 97 ML/MIN/1.73
GLUCOSE SERPL-MCNC: 116 MG/DL (ref 65–99)
HCO3 BLDA-SCNC: 25.4 MMOL/L (ref 21–28)
HCT VFR BLD AUTO: 38.4 % (ref 34–46.6)
HEMODILUTION: NO
HGB BLD-MCNC: 13 G/DL (ref 12–15.9)
INHALED O2 CONCENTRATION: 80 %
LYMPHOCYTES # BLD AUTO: 0.4 10*3/MM3 (ref 0.7–3.1)
LYMPHOCYTES NFR BLD AUTO: 7.2 % (ref 19.6–45.3)
MAGNESIUM SERPL-MCNC: 2 MG/DL (ref 1.6–2.4)
MCH RBC QN AUTO: 28.4 PG (ref 26.6–33)
MCHC RBC AUTO-ENTMCNC: 34 G/DL (ref 31.5–35.7)
MCV RBC AUTO: 83.5 FL (ref 79–97)
MODALITY: ABNORMAL
MONOCYTES # BLD AUTO: 0.2 10*3/MM3 (ref 0.1–0.9)
MONOCYTES NFR BLD AUTO: 4 % (ref 5–12)
NEUTROPHILS NFR BLD AUTO: 4.6 10*3/MM3 (ref 1.7–7)
NEUTROPHILS NFR BLD AUTO: 87.5 % (ref 42.7–76)
NRBC BLD AUTO-RTO: 0.1 /100 WBC (ref 0–0.2)
PCO2 BLDA: 33.1 MM HG (ref 35–48)
PH BLDA: 7.49 PH UNITS (ref 7.35–7.45)
PHOSPHATE SERPL-MCNC: 2.3 MG/DL (ref 2.5–4.5)
PLATELET # BLD AUTO: 285 10*3/MM3 (ref 140–450)
PMV BLD AUTO: 8.4 FL (ref 6–12)
PO2 BLDA: 53.7 MM HG (ref 83–108)
POTASSIUM SERPL-SCNC: 3.7 MMOL/L (ref 3.5–5.2)
RBC # BLD AUTO: 4.59 10*6/MM3 (ref 3.77–5.28)
SAO2 % BLDCOA: 90.4 % (ref 94–98)
SODIUM SERPL-SCNC: 135 MMOL/L (ref 136–145)
WBC NRBC COR # BLD: 5.3 10*3/MM3 (ref 3.4–10.8)

## 2022-01-23 PROCEDURE — 84100 ASSAY OF PHOSPHORUS: CPT | Performed by: INTERNAL MEDICINE

## 2022-01-23 PROCEDURE — 36600 WITHDRAWAL OF ARTERIAL BLOOD: CPT

## 2022-01-23 PROCEDURE — 94761 N-INVAS EAR/PLS OXIMETRY MLT: CPT

## 2022-01-23 PROCEDURE — 94799 UNLISTED PULMONARY SVC/PX: CPT

## 2022-01-23 PROCEDURE — 85025 COMPLETE CBC W/AUTO DIFF WBC: CPT | Performed by: INTERNAL MEDICINE

## 2022-01-23 PROCEDURE — 63710000001 DEXAMETHASONE PER 0.25 MG: Performed by: NURSE PRACTITIONER

## 2022-01-23 PROCEDURE — 80048 BASIC METABOLIC PNL TOTAL CA: CPT | Performed by: INTERNAL MEDICINE

## 2022-01-23 PROCEDURE — 86140 C-REACTIVE PROTEIN: CPT | Performed by: INTERNAL MEDICINE

## 2022-01-23 PROCEDURE — 83735 ASSAY OF MAGNESIUM: CPT | Performed by: NURSE PRACTITIONER

## 2022-01-23 PROCEDURE — 85379 FIBRIN DEGRADATION QUANT: CPT | Performed by: NURSE PRACTITIONER

## 2022-01-23 PROCEDURE — 82803 BLOOD GASES ANY COMBINATION: CPT

## 2022-01-23 PROCEDURE — 94660 CPAP INITIATION&MGMT: CPT

## 2022-01-23 PROCEDURE — 25010000002 ENOXAPARIN PER 10 MG: Performed by: INTERNAL MEDICINE

## 2022-01-23 PROCEDURE — 25010000002 PIPERACILLIN SOD-TAZOBACTAM PER 1 G: Performed by: INTERNAL MEDICINE

## 2022-01-23 PROCEDURE — 99233 SBSQ HOSP IP/OBS HIGH 50: CPT | Performed by: INTERNAL MEDICINE

## 2022-01-23 RX ORDER — DEXAMETHASONE 6 MG/1
6 TABLET ORAL EVERY 12 HOURS SCHEDULED
Status: DISCONTINUED | OUTPATIENT
Start: 2022-01-23 | End: 2022-01-29

## 2022-01-23 RX ADMIN — ENOXAPARIN SODIUM 60 MG: 60 INJECTION SUBCUTANEOUS at 16:55

## 2022-01-23 RX ADMIN — DIPHENHYDRAMINE HYDROCHLORIDE AND LIDOCAINE HYDROCHLORIDE AND ALUMINUM HYDROXIDE AND MAGNESIUM HYDRO 10 ML: KIT at 10:44

## 2022-01-23 RX ADMIN — SODIUM CHLORIDE, PRESERVATIVE FREE 10 ML: 5 INJECTION INTRAVENOUS at 07:48

## 2022-01-23 RX ADMIN — DIPHENHYDRAMINE HYDROCHLORIDE AND LIDOCAINE HYDROCHLORIDE AND ALUMINUM HYDROXIDE AND MAGNESIUM HYDRO 10 ML: KIT at 16:56

## 2022-01-23 RX ADMIN — MIDODRINE HYDROCHLORIDE 10 MG: 5 TABLET ORAL at 16:55

## 2022-01-23 RX ADMIN — DEXAMETHASONE 6 MG: 4 TABLET ORAL at 07:48

## 2022-01-23 RX ADMIN — PIPERACILLIN AND TAZOBACTAM 4.5 G: 4; .5 INJECTION, POWDER, FOR SOLUTION INTRAVENOUS at 07:45

## 2022-01-23 RX ADMIN — MIDODRINE HYDROCHLORIDE 10 MG: 5 TABLET ORAL at 10:44

## 2022-01-23 RX ADMIN — DOCUSATE SODIUM 50 MG AND SENNOSIDES 8.6 MG 2 TABLET: 8.6; 5 TABLET, FILM COATED ORAL at 21:20

## 2022-01-23 RX ADMIN — PIPERACILLIN AND TAZOBACTAM 4.5 G: 4; .5 INJECTION, POWDER, FOR SOLUTION INTRAVENOUS at 16:55

## 2022-01-23 RX ADMIN — MIDODRINE HYDROCHLORIDE 10 MG: 5 TABLET ORAL at 07:44

## 2022-01-23 RX ADMIN — SODIUM CHLORIDE, PRESERVATIVE FREE 10 ML: 5 INJECTION INTRAVENOUS at 21:20

## 2022-01-23 RX ADMIN — PANTOPRAZOLE SODIUM 40 MG: 40 TABLET, DELAYED RELEASE ORAL at 07:45

## 2022-01-23 RX ADMIN — DEXAMETHASONE 6 MG: 6 TABLET ORAL at 21:20

## 2022-01-23 RX ADMIN — MIRTAZAPINE 15 MG: 15 TABLET, FILM COATED ORAL at 21:19

## 2022-01-23 RX ADMIN — Medication 5 MG: at 21:20

## 2022-01-23 RX ADMIN — DIPHENHYDRAMINE HYDROCHLORIDE AND LIDOCAINE HYDROCHLORIDE AND ALUMINUM HYDROXIDE AND MAGNESIUM HYDRO 10 ML: KIT at 05:17

## 2022-01-23 RX ADMIN — DOCUSATE SODIUM 50 MG AND SENNOSIDES 8.6 MG 2 TABLET: 8.6; 5 TABLET, FILM COATED ORAL at 07:48

## 2022-01-24 ENCOUNTER — APPOINTMENT (OUTPATIENT)
Dept: CARDIOLOGY | Facility: HOSPITAL | Age: 77
End: 2022-01-24

## 2022-01-24 LAB
ANION GAP SERPL CALCULATED.3IONS-SCNC: 8 MMOL/L (ref 5–15)
BASOPHILS # BLD AUTO: 0 10*3/MM3 (ref 0–0.2)
BASOPHILS NFR BLD AUTO: 0.2 % (ref 0–1.5)
BH CV LOWER VASCULAR LEFT COMMON FEMORAL AUGMENT: NORMAL
BH CV LOWER VASCULAR LEFT COMMON FEMORAL COMPETENT: NORMAL
BH CV LOWER VASCULAR LEFT COMMON FEMORAL COMPRESS: NORMAL
BH CV LOWER VASCULAR LEFT COMMON FEMORAL PHASIC: NORMAL
BH CV LOWER VASCULAR LEFT COMMON FEMORAL SPONT: NORMAL
BH CV LOWER VASCULAR LEFT DISTAL FEMORAL COMPRESS: NORMAL
BH CV LOWER VASCULAR LEFT GASTRONEMIUS COMPRESS: NORMAL
BH CV LOWER VASCULAR LEFT GREATER SAPH AK COMPRESS: NORMAL
BH CV LOWER VASCULAR LEFT GREATER SAPH BK COMPRESS: NORMAL
BH CV LOWER VASCULAR LEFT LESSER SAPH COMPRESS: NORMAL
BH CV LOWER VASCULAR LEFT MID FEMORAL AUGMENT: NORMAL
BH CV LOWER VASCULAR LEFT MID FEMORAL COMPETENT: NORMAL
BH CV LOWER VASCULAR LEFT MID FEMORAL COMPRESS: NORMAL
BH CV LOWER VASCULAR LEFT MID FEMORAL PHASIC: NORMAL
BH CV LOWER VASCULAR LEFT MID FEMORAL SPONT: NORMAL
BH CV LOWER VASCULAR LEFT PERONEAL COMPRESS: NORMAL
BH CV LOWER VASCULAR LEFT POPLITEAL AUGMENT: NORMAL
BH CV LOWER VASCULAR LEFT POPLITEAL COMPETENT: NORMAL
BH CV LOWER VASCULAR LEFT POPLITEAL COMPRESS: NORMAL
BH CV LOWER VASCULAR LEFT POPLITEAL PHASIC: NORMAL
BH CV LOWER VASCULAR LEFT POPLITEAL SPONT: NORMAL
BH CV LOWER VASCULAR LEFT POSTERIOR TIBIAL COMPRESS: NORMAL
BH CV LOWER VASCULAR LEFT PROXIMAL FEMORAL COMPRESS: NORMAL
BH CV LOWER VASCULAR LEFT SAPHENOFEMORAL JUNCTION COMPRESS: NORMAL
BH CV LOWER VASCULAR RIGHT COMMON FEMORAL AUGMENT: NORMAL
BH CV LOWER VASCULAR RIGHT COMMON FEMORAL COMPETENT: NORMAL
BH CV LOWER VASCULAR RIGHT COMMON FEMORAL COMPRESS: NORMAL
BH CV LOWER VASCULAR RIGHT COMMON FEMORAL PHASIC: NORMAL
BH CV LOWER VASCULAR RIGHT COMMON FEMORAL SPONT: NORMAL
BH CV LOWER VASCULAR RIGHT DISTAL FEMORAL COMPRESS: NORMAL
BH CV LOWER VASCULAR RIGHT GASTRONEMIUS COMPRESS: NORMAL
BH CV LOWER VASCULAR RIGHT GREATER SAPH AK COMPRESS: NORMAL
BH CV LOWER VASCULAR RIGHT GREATER SAPH BK COMPRESS: NORMAL
BH CV LOWER VASCULAR RIGHT LESSER SAPH COMPRESS: NORMAL
BH CV LOWER VASCULAR RIGHT MID FEMORAL AUGMENT: NORMAL
BH CV LOWER VASCULAR RIGHT MID FEMORAL COMPETENT: NORMAL
BH CV LOWER VASCULAR RIGHT MID FEMORAL COMPRESS: NORMAL
BH CV LOWER VASCULAR RIGHT MID FEMORAL PHASIC: NORMAL
BH CV LOWER VASCULAR RIGHT MID FEMORAL SPONT: NORMAL
BH CV LOWER VASCULAR RIGHT PERONEAL COMPRESS: NORMAL
BH CV LOWER VASCULAR RIGHT POPLITEAL AUGMENT: NORMAL
BH CV LOWER VASCULAR RIGHT POPLITEAL COMPETENT: NORMAL
BH CV LOWER VASCULAR RIGHT POPLITEAL COMPRESS: NORMAL
BH CV LOWER VASCULAR RIGHT POPLITEAL PHASIC: NORMAL
BH CV LOWER VASCULAR RIGHT POPLITEAL SPONT: NORMAL
BH CV LOWER VASCULAR RIGHT POSTERIOR TIBIAL COMPRESS: NORMAL
BH CV LOWER VASCULAR RIGHT PROXIMAL FEMORAL COMPRESS: NORMAL
BUN SERPL-MCNC: 13 MG/DL (ref 8–23)
BUN/CREAT SERPL: 23.2 (ref 7–25)
CALCIUM SPEC-SCNC: 7.9 MG/DL (ref 8.6–10.5)
CHLORIDE SERPL-SCNC: 103 MMOL/L (ref 98–107)
CO2 SERPL-SCNC: 24 MMOL/L (ref 22–29)
CREAT SERPL-MCNC: 0.56 MG/DL (ref 0.57–1)
CRP SERPL-MCNC: 9.59 MG/DL (ref 0–0.5)
DEPRECATED RDW RBC AUTO: 51.6 FL (ref 37–54)
EOSINOPHIL # BLD AUTO: 0 10*3/MM3 (ref 0–0.4)
EOSINOPHIL NFR BLD AUTO: 0 % (ref 0.3–6.2)
ERYTHROCYTE [DISTWIDTH] IN BLOOD BY AUTOMATED COUNT: 17.6 % (ref 12.3–15.4)
GFR SERPL CREATININE-BSD FRML MDRD: 105 ML/MIN/1.73
GLUCOSE SERPL-MCNC: 139 MG/DL (ref 65–99)
HCT VFR BLD AUTO: 38.7 % (ref 34–46.6)
HGB BLD-MCNC: 13.1 G/DL (ref 12–15.9)
LYMPHOCYTES # BLD AUTO: 0.4 10*3/MM3 (ref 0.7–3.1)
LYMPHOCYTES NFR BLD AUTO: 7.6 % (ref 19.6–45.3)
MAGNESIUM SERPL-MCNC: 2.2 MG/DL (ref 1.6–2.4)
MAXIMAL PREDICTED HEART RATE: 143 BPM
MCH RBC QN AUTO: 28.5 PG (ref 26.6–33)
MCHC RBC AUTO-ENTMCNC: 34 G/DL (ref 31.5–35.7)
MCV RBC AUTO: 83.9 FL (ref 79–97)
MONOCYTES # BLD AUTO: 0.3 10*3/MM3 (ref 0.1–0.9)
MONOCYTES NFR BLD AUTO: 5.3 % (ref 5–12)
NEUTROPHILS NFR BLD AUTO: 4.9 10*3/MM3 (ref 1.7–7)
NEUTROPHILS NFR BLD AUTO: 86.9 % (ref 42.7–76)
NRBC BLD AUTO-RTO: 0.1 /100 WBC (ref 0–0.2)
NT-PROBNP SERPL-MCNC: 1870 PG/ML (ref 0–1800)
PHOSPHATE SERPL-MCNC: 2.7 MG/DL (ref 2.5–4.5)
PLATELET # BLD AUTO: 331 10*3/MM3 (ref 140–450)
PMV BLD AUTO: 8.8 FL (ref 6–12)
POTASSIUM SERPL-SCNC: 4 MMOL/L (ref 3.5–5.2)
RBC # BLD AUTO: 4.62 10*6/MM3 (ref 3.77–5.28)
SODIUM SERPL-SCNC: 135 MMOL/L (ref 136–145)
STRESS TARGET HR: 122 BPM
WBC NRBC COR # BLD: 5.6 10*3/MM3 (ref 3.4–10.8)

## 2022-01-24 PROCEDURE — 94799 UNLISTED PULMONARY SVC/PX: CPT

## 2022-01-24 PROCEDURE — 93970 EXTREMITY STUDY: CPT

## 2022-01-24 PROCEDURE — 83735 ASSAY OF MAGNESIUM: CPT | Performed by: INTERNAL MEDICINE

## 2022-01-24 PROCEDURE — 36415 COLL VENOUS BLD VENIPUNCTURE: CPT | Performed by: INTERNAL MEDICINE

## 2022-01-24 PROCEDURE — 97110 THERAPEUTIC EXERCISES: CPT

## 2022-01-24 PROCEDURE — 80048 BASIC METABOLIC PNL TOTAL CA: CPT | Performed by: INTERNAL MEDICINE

## 2022-01-24 PROCEDURE — 85025 COMPLETE CBC W/AUTO DIFF WBC: CPT | Performed by: INTERNAL MEDICINE

## 2022-01-24 PROCEDURE — 84100 ASSAY OF PHOSPHORUS: CPT | Performed by: INTERNAL MEDICINE

## 2022-01-24 PROCEDURE — 25010000002 PIPERACILLIN SOD-TAZOBACTAM PER 1 G: Performed by: INTERNAL MEDICINE

## 2022-01-24 PROCEDURE — 86140 C-REACTIVE PROTEIN: CPT | Performed by: INTERNAL MEDICINE

## 2022-01-24 PROCEDURE — 99233 SBSQ HOSP IP/OBS HIGH 50: CPT | Performed by: INTERNAL MEDICINE

## 2022-01-24 PROCEDURE — 25010000002 ENOXAPARIN PER 10 MG: Performed by: INTERNAL MEDICINE

## 2022-01-24 PROCEDURE — 94660 CPAP INITIATION&MGMT: CPT

## 2022-01-24 PROCEDURE — 83880 ASSAY OF NATRIURETIC PEPTIDE: CPT | Performed by: INTERNAL MEDICINE

## 2022-01-24 RX ADMIN — PANTOPRAZOLE SODIUM 40 MG: 40 TABLET, DELAYED RELEASE ORAL at 06:14

## 2022-01-24 RX ADMIN — PIPERACILLIN AND TAZOBACTAM 4.5 G: 4; .5 INJECTION, POWDER, FOR SOLUTION INTRAVENOUS at 00:26

## 2022-01-24 RX ADMIN — MIDODRINE HYDROCHLORIDE 10 MG: 5 TABLET ORAL at 06:33

## 2022-01-24 RX ADMIN — DIPHENHYDRAMINE HYDROCHLORIDE AND LIDOCAINE HYDROCHLORIDE AND ALUMINUM HYDROXIDE AND MAGNESIUM HYDRO 10 ML: KIT at 04:55

## 2022-01-24 RX ADMIN — MIDODRINE HYDROCHLORIDE 10 MG: 5 TABLET ORAL at 06:18

## 2022-01-24 RX ADMIN — ENOXAPARIN SODIUM 60 MG: 60 INJECTION SUBCUTANEOUS at 15:51

## 2022-01-24 RX ADMIN — DIPHENHYDRAMINE HYDROCHLORIDE AND LIDOCAINE HYDROCHLORIDE AND ALUMINUM HYDROXIDE AND MAGNESIUM HYDRO 10 ML: KIT at 00:27

## 2022-01-24 RX ADMIN — DEXAMETHASONE 6 MG: 6 TABLET ORAL at 08:26

## 2022-01-24 RX ADMIN — ENOXAPARIN SODIUM 60 MG: 60 INJECTION SUBCUTANEOUS at 02:39

## 2022-01-24 RX ADMIN — PIPERACILLIN AND TAZOBACTAM 3.38 G: 3; .375 INJECTION, POWDER, LYOPHILIZED, FOR SOLUTION INTRAVENOUS at 15:51

## 2022-01-24 RX ADMIN — MIDODRINE HYDROCHLORIDE 10 MG: 5 TABLET ORAL at 11:58

## 2022-01-24 RX ADMIN — PIPERACILLIN AND TAZOBACTAM 4.5 G: 4; .5 INJECTION, POWDER, FOR SOLUTION INTRAVENOUS at 08:26

## 2022-01-24 RX ADMIN — DEXAMETHASONE 6 MG: 6 TABLET ORAL at 21:55

## 2022-01-24 RX ADMIN — MIRTAZAPINE 15 MG: 15 TABLET, FILM COATED ORAL at 21:55

## 2022-01-24 RX ADMIN — DOCUSATE SODIUM 50 MG AND SENNOSIDES 8.6 MG 2 TABLET: 8.6; 5 TABLET, FILM COATED ORAL at 08:26

## 2022-01-24 RX ADMIN — SODIUM CHLORIDE, PRESERVATIVE FREE 10 ML: 5 INJECTION INTRAVENOUS at 21:55

## 2022-01-24 RX ADMIN — SODIUM CHLORIDE, PRESERVATIVE FREE 10 ML: 5 INJECTION INTRAVENOUS at 08:27

## 2022-01-25 LAB
D DIMER PPP FEU-MCNC: 0.9 MG/L (FEU) (ref 0–0.59)
MAGNESIUM SERPL-MCNC: 2 MG/DL (ref 1.6–2.4)
POTASSIUM SERPL-SCNC: 4.1 MMOL/L (ref 3.5–5.2)
PROCALCITONIN SERPL-MCNC: 0.17 NG/ML (ref 0–0.25)

## 2022-01-25 PROCEDURE — 83735 ASSAY OF MAGNESIUM: CPT | Performed by: INTERNAL MEDICINE

## 2022-01-25 PROCEDURE — 84132 ASSAY OF SERUM POTASSIUM: CPT | Performed by: INTERNAL MEDICINE

## 2022-01-25 PROCEDURE — 25010000002 ENOXAPARIN PER 10 MG: Performed by: INTERNAL MEDICINE

## 2022-01-25 PROCEDURE — 94799 UNLISTED PULMONARY SVC/PX: CPT

## 2022-01-25 PROCEDURE — 25010000002 PIPERACILLIN SOD-TAZOBACTAM PER 1 G: Performed by: INTERNAL MEDICINE

## 2022-01-25 PROCEDURE — 94660 CPAP INITIATION&MGMT: CPT

## 2022-01-25 PROCEDURE — 84145 PROCALCITONIN (PCT): CPT | Performed by: INTERNAL MEDICINE

## 2022-01-25 PROCEDURE — 85379 FIBRIN DEGRADATION QUANT: CPT | Performed by: INTERNAL MEDICINE

## 2022-01-25 PROCEDURE — 99233 SBSQ HOSP IP/OBS HIGH 50: CPT | Performed by: INTERNAL MEDICINE

## 2022-01-25 RX ORDER — AMOXICILLIN AND CLAVULANATE POTASSIUM 875; 125 MG/1; MG/1
1 TABLET, FILM COATED ORAL EVERY 12 HOURS SCHEDULED
Status: COMPLETED | OUTPATIENT
Start: 2022-01-25 | End: 2022-01-28

## 2022-01-25 RX ADMIN — MIRTAZAPINE 15 MG: 15 TABLET, FILM COATED ORAL at 20:16

## 2022-01-25 RX ADMIN — PANTOPRAZOLE SODIUM 40 MG: 40 TABLET, DELAYED RELEASE ORAL at 08:25

## 2022-01-25 RX ADMIN — PIPERACILLIN AND TAZOBACTAM 3.38 G: 3; .375 INJECTION, POWDER, LYOPHILIZED, FOR SOLUTION INTRAVENOUS at 00:22

## 2022-01-25 RX ADMIN — ENOXAPARIN SODIUM 60 MG: 60 INJECTION SUBCUTANEOUS at 13:12

## 2022-01-25 RX ADMIN — SODIUM CHLORIDE, PRESERVATIVE FREE 10 ML: 5 INJECTION INTRAVENOUS at 08:25

## 2022-01-25 RX ADMIN — AMOXICILLIN AND CLAVULANATE POTASSIUM 1 TABLET: 875; 125 TABLET, FILM COATED ORAL at 17:16

## 2022-01-25 RX ADMIN — DEXAMETHASONE 6 MG: 6 TABLET ORAL at 08:25

## 2022-01-25 RX ADMIN — ENOXAPARIN SODIUM 60 MG: 60 INJECTION SUBCUTANEOUS at 02:46

## 2022-01-25 RX ADMIN — MIDODRINE HYDROCHLORIDE 10 MG: 5 TABLET ORAL at 17:16

## 2022-01-25 RX ADMIN — Medication 5 MG: at 20:16

## 2022-01-25 RX ADMIN — DIPHENHYDRAMINE HYDROCHLORIDE AND LIDOCAINE HYDROCHLORIDE AND ALUMINUM HYDROXIDE AND MAGNESIUM HYDRO 10 ML: KIT at 17:16

## 2022-01-25 RX ADMIN — DOCUSATE SODIUM 50 MG AND SENNOSIDES 8.6 MG 2 TABLET: 8.6; 5 TABLET, FILM COATED ORAL at 08:25

## 2022-01-25 RX ADMIN — DIPHENHYDRAMINE HYDROCHLORIDE AND LIDOCAINE HYDROCHLORIDE AND ALUMINUM HYDROXIDE AND MAGNESIUM HYDRO 10 ML: KIT at 10:44

## 2022-01-25 RX ADMIN — DEXAMETHASONE 6 MG: 6 TABLET ORAL at 20:16

## 2022-01-25 RX ADMIN — SODIUM CHLORIDE, PRESERVATIVE FREE 10 ML: 5 INJECTION INTRAVENOUS at 20:18

## 2022-01-25 RX ADMIN — MIDODRINE HYDROCHLORIDE 10 MG: 5 TABLET ORAL at 10:44

## 2022-01-25 RX ADMIN — PIPERACILLIN AND TAZOBACTAM 3.38 G: 3; .375 INJECTION, POWDER, LYOPHILIZED, FOR SOLUTION INTRAVENOUS at 08:25

## 2022-01-26 LAB
MAGNESIUM SERPL-MCNC: 2.1 MG/DL (ref 1.6–2.4)
POTASSIUM SERPL-SCNC: 4.3 MMOL/L (ref 3.5–5.2)

## 2022-01-26 PROCEDURE — 99233 SBSQ HOSP IP/OBS HIGH 50: CPT | Performed by: INTERNAL MEDICINE

## 2022-01-26 PROCEDURE — 94799 UNLISTED PULMONARY SVC/PX: CPT

## 2022-01-26 PROCEDURE — 97530 THERAPEUTIC ACTIVITIES: CPT

## 2022-01-26 PROCEDURE — 25010000002 ENOXAPARIN PER 10 MG: Performed by: INTERNAL MEDICINE

## 2022-01-26 PROCEDURE — 36415 COLL VENOUS BLD VENIPUNCTURE: CPT | Performed by: INTERNAL MEDICINE

## 2022-01-26 PROCEDURE — 84132 ASSAY OF SERUM POTASSIUM: CPT | Performed by: INTERNAL MEDICINE

## 2022-01-26 PROCEDURE — 94660 CPAP INITIATION&MGMT: CPT

## 2022-01-26 PROCEDURE — 97110 THERAPEUTIC EXERCISES: CPT

## 2022-01-26 PROCEDURE — 83735 ASSAY OF MAGNESIUM: CPT | Performed by: INTERNAL MEDICINE

## 2022-01-26 RX ADMIN — MIDODRINE HYDROCHLORIDE 10 MG: 5 TABLET ORAL at 16:42

## 2022-01-26 RX ADMIN — DEXAMETHASONE 6 MG: 6 TABLET ORAL at 20:24

## 2022-01-26 RX ADMIN — DIPHENHYDRAMINE HYDROCHLORIDE AND LIDOCAINE HYDROCHLORIDE AND ALUMINUM HYDROXIDE AND MAGNESIUM HYDRO 5 ML: KIT at 10:05

## 2022-01-26 RX ADMIN — ENOXAPARIN SODIUM 60 MG: 60 INJECTION SUBCUTANEOUS at 04:18

## 2022-01-26 RX ADMIN — DIPHENHYDRAMINE HYDROCHLORIDE AND LIDOCAINE HYDROCHLORIDE AND ALUMINUM HYDROXIDE AND MAGNESIUM HYDRO 10 ML: KIT at 16:42

## 2022-01-26 RX ADMIN — ENOXAPARIN SODIUM 60 MG: 60 INJECTION SUBCUTANEOUS at 14:42

## 2022-01-26 RX ADMIN — PANTOPRAZOLE SODIUM 40 MG: 40 TABLET, DELAYED RELEASE ORAL at 10:04

## 2022-01-26 RX ADMIN — AMOXICILLIN AND CLAVULANATE POTASSIUM 1 TABLET: 875; 125 TABLET, FILM COATED ORAL at 10:03

## 2022-01-26 RX ADMIN — DOCUSATE SODIUM 50 MG AND SENNOSIDES 8.6 MG 2 TABLET: 8.6; 5 TABLET, FILM COATED ORAL at 10:04

## 2022-01-26 RX ADMIN — MIDODRINE HYDROCHLORIDE 10 MG: 5 TABLET ORAL at 12:10

## 2022-01-26 RX ADMIN — AMOXICILLIN AND CLAVULANATE POTASSIUM 1 TABLET: 875; 125 TABLET, FILM COATED ORAL at 20:24

## 2022-01-26 RX ADMIN — MIRTAZAPINE 15 MG: 15 TABLET, FILM COATED ORAL at 20:24

## 2022-01-26 RX ADMIN — DEXAMETHASONE 6 MG: 6 TABLET ORAL at 10:04

## 2022-01-26 RX ADMIN — MIDODRINE HYDROCHLORIDE 10 MG: 5 TABLET ORAL at 10:03

## 2022-01-26 RX ADMIN — SODIUM CHLORIDE, PRESERVATIVE FREE 10 ML: 5 INJECTION INTRAVENOUS at 10:05

## 2022-01-27 ENCOUNTER — APPOINTMENT (OUTPATIENT)
Dept: GENERAL RADIOLOGY | Facility: HOSPITAL | Age: 77
End: 2022-01-27

## 2022-01-27 LAB
ANION GAP SERPL CALCULATED.3IONS-SCNC: 8 MMOL/L (ref 5–15)
BACTERIA SPEC AEROBE CULT: NORMAL
BACTERIA SPEC AEROBE CULT: NORMAL
BUN SERPL-MCNC: 16 MG/DL (ref 8–23)
BUN/CREAT SERPL: 38.1 (ref 7–25)
CALCIUM SPEC-SCNC: 8.2 MG/DL (ref 8.6–10.5)
CHLORIDE SERPL-SCNC: 107 MMOL/L (ref 98–107)
CO2 SERPL-SCNC: 25 MMOL/L (ref 22–29)
CREAT SERPL-MCNC: 0.42 MG/DL (ref 0.57–1)
D DIMER PPP FEU-MCNC: 0.77 MG/L (FEU) (ref 0–0.59)
DEPRECATED RDW RBC AUTO: 52.5 FL (ref 37–54)
ERYTHROCYTE [DISTWIDTH] IN BLOOD BY AUTOMATED COUNT: 17.8 % (ref 12.3–15.4)
GFR SERPL CREATININE-BSD FRML MDRD: 146 ML/MIN/1.73
GLUCOSE SERPL-MCNC: 117 MG/DL (ref 65–99)
HCT VFR BLD AUTO: 36.9 % (ref 34–46.6)
HGB BLD-MCNC: 12.5 G/DL (ref 12–15.9)
MAGNESIUM SERPL-MCNC: 2.1 MG/DL (ref 1.6–2.4)
MCH RBC QN AUTO: 28.4 PG (ref 26.6–33)
MCHC RBC AUTO-ENTMCNC: 33.8 G/DL (ref 31.5–35.7)
MCV RBC AUTO: 84 FL (ref 79–97)
PLATELET # BLD AUTO: 459 10*3/MM3 (ref 140–450)
PMV BLD AUTO: 9.5 FL (ref 6–12)
POTASSIUM SERPL-SCNC: 4.7 MMOL/L (ref 3.5–5.2)
RBC # BLD AUTO: 4.39 10*6/MM3 (ref 3.77–5.28)
SODIUM SERPL-SCNC: 140 MMOL/L (ref 136–145)
WBC NRBC COR # BLD: 7.8 10*3/MM3 (ref 3.4–10.8)

## 2022-01-27 PROCEDURE — 97530 THERAPEUTIC ACTIVITIES: CPT

## 2022-01-27 PROCEDURE — 99233 SBSQ HOSP IP/OBS HIGH 50: CPT | Performed by: INTERNAL MEDICINE

## 2022-01-27 PROCEDURE — 80048 BASIC METABOLIC PNL TOTAL CA: CPT | Performed by: INTERNAL MEDICINE

## 2022-01-27 PROCEDURE — 94660 CPAP INITIATION&MGMT: CPT

## 2022-01-27 PROCEDURE — 94799 UNLISTED PULMONARY SVC/PX: CPT

## 2022-01-27 PROCEDURE — 83735 ASSAY OF MAGNESIUM: CPT | Performed by: INTERNAL MEDICINE

## 2022-01-27 PROCEDURE — 85027 COMPLETE CBC AUTOMATED: CPT | Performed by: INTERNAL MEDICINE

## 2022-01-27 PROCEDURE — 85379 FIBRIN DEGRADATION QUANT: CPT | Performed by: INTERNAL MEDICINE

## 2022-01-27 PROCEDURE — 71045 X-RAY EXAM CHEST 1 VIEW: CPT

## 2022-01-27 PROCEDURE — 25010000002 ENOXAPARIN PER 10 MG: Performed by: INTERNAL MEDICINE

## 2022-01-27 RX ORDER — ECHINACEA PURPUREA EXTRACT 125 MG
1 TABLET ORAL AS NEEDED
Status: DISCONTINUED | OUTPATIENT
Start: 2022-01-27 | End: 2022-02-07 | Stop reason: HOSPADM

## 2022-01-27 RX ADMIN — SODIUM CHLORIDE, PRESERVATIVE FREE 10 ML: 5 INJECTION INTRAVENOUS at 08:31

## 2022-01-27 RX ADMIN — DIPHENHYDRAMINE HYDROCHLORIDE AND LIDOCAINE HYDROCHLORIDE AND ALUMINUM HYDROXIDE AND MAGNESIUM HYDRO 10 ML: KIT at 17:07

## 2022-01-27 RX ADMIN — ENOXAPARIN SODIUM 60 MG: 60 INJECTION SUBCUTANEOUS at 02:02

## 2022-01-27 RX ADMIN — DIPHENHYDRAMINE HYDROCHLORIDE AND LIDOCAINE HYDROCHLORIDE AND ALUMINUM HYDROXIDE AND MAGNESIUM HYDRO 10 ML: KIT at 10:20

## 2022-01-27 RX ADMIN — DEXAMETHASONE 6 MG: 6 TABLET ORAL at 08:30

## 2022-01-27 RX ADMIN — AMOXICILLIN AND CLAVULANATE POTASSIUM 1 TABLET: 875; 125 TABLET, FILM COATED ORAL at 20:33

## 2022-01-27 RX ADMIN — DOCUSATE SODIUM 50 MG AND SENNOSIDES 8.6 MG 2 TABLET: 8.6; 5 TABLET, FILM COATED ORAL at 08:30

## 2022-01-27 RX ADMIN — DEXAMETHASONE 6 MG: 6 TABLET ORAL at 20:33

## 2022-01-27 RX ADMIN — SODIUM CHLORIDE, PRESERVATIVE FREE 10 ML: 5 INJECTION INTRAVENOUS at 20:33

## 2022-01-27 RX ADMIN — MIDODRINE HYDROCHLORIDE 10 MG: 5 TABLET ORAL at 08:30

## 2022-01-27 RX ADMIN — DOCUSATE SODIUM 50 MG AND SENNOSIDES 8.6 MG 2 TABLET: 8.6; 5 TABLET, FILM COATED ORAL at 20:33

## 2022-01-27 RX ADMIN — PANTOPRAZOLE SODIUM 40 MG: 40 TABLET, DELAYED RELEASE ORAL at 08:30

## 2022-01-27 RX ADMIN — AMOXICILLIN AND CLAVULANATE POTASSIUM 1 TABLET: 875; 125 TABLET, FILM COATED ORAL at 08:30

## 2022-01-27 RX ADMIN — Medication 1 SPRAY: at 18:10

## 2022-01-27 RX ADMIN — ENOXAPARIN SODIUM 60 MG: 60 INJECTION SUBCUTANEOUS at 15:01

## 2022-01-27 RX ADMIN — MIRTAZAPINE 15 MG: 15 TABLET, FILM COATED ORAL at 20:33

## 2022-01-27 RX ADMIN — MIDODRINE HYDROCHLORIDE 10 MG: 5 TABLET ORAL at 17:08

## 2022-01-28 LAB
MAGNESIUM SERPL-MCNC: 2.2 MG/DL (ref 1.6–2.4)
POTASSIUM SERPL-SCNC: 4.4 MMOL/L (ref 3.5–5.2)

## 2022-01-28 PROCEDURE — 94799 UNLISTED PULMONARY SVC/PX: CPT

## 2022-01-28 PROCEDURE — 83735 ASSAY OF MAGNESIUM: CPT | Performed by: INTERNAL MEDICINE

## 2022-01-28 PROCEDURE — 94660 CPAP INITIATION&MGMT: CPT

## 2022-01-28 PROCEDURE — 25010000002 ENOXAPARIN PER 10 MG: Performed by: INTERNAL MEDICINE

## 2022-01-28 PROCEDURE — 36415 COLL VENOUS BLD VENIPUNCTURE: CPT | Performed by: INTERNAL MEDICINE

## 2022-01-28 PROCEDURE — 97116 GAIT TRAINING THERAPY: CPT

## 2022-01-28 PROCEDURE — 97530 THERAPEUTIC ACTIVITIES: CPT

## 2022-01-28 PROCEDURE — 84132 ASSAY OF SERUM POTASSIUM: CPT | Performed by: INTERNAL MEDICINE

## 2022-01-28 PROCEDURE — 99233 SBSQ HOSP IP/OBS HIGH 50: CPT | Performed by: INTERNAL MEDICINE

## 2022-01-28 RX ORDER — LIDOCAINE HYDROCHLORIDE 20 MG/ML
5 SOLUTION OROPHARYNGEAL
Status: DISCONTINUED | OUTPATIENT
Start: 2022-01-28 | End: 2022-02-07 | Stop reason: HOSPADM

## 2022-01-28 RX ADMIN — DEXAMETHASONE 6 MG: 6 TABLET ORAL at 09:01

## 2022-01-28 RX ADMIN — DIPHENHYDRAMINE HYDROCHLORIDE AND LIDOCAINE HYDROCHLORIDE AND ALUMINUM HYDROXIDE AND MAGNESIUM HYDRO 10 ML: KIT at 11:21

## 2022-01-28 RX ADMIN — DOCUSATE SODIUM 50 MG AND SENNOSIDES 8.6 MG 2 TABLET: 8.6; 5 TABLET, FILM COATED ORAL at 20:38

## 2022-01-28 RX ADMIN — DIPHENHYDRAMINE HYDROCHLORIDE AND LIDOCAINE HYDROCHLORIDE AND ALUMINUM HYDROXIDE AND MAGNESIUM HYDRO 10 ML: KIT at 16:35

## 2022-01-28 RX ADMIN — MIDODRINE HYDROCHLORIDE 10 MG: 5 TABLET ORAL at 09:02

## 2022-01-28 RX ADMIN — MIDODRINE HYDROCHLORIDE 10 MG: 5 TABLET ORAL at 16:35

## 2022-01-28 RX ADMIN — DEXAMETHASONE 6 MG: 6 TABLET ORAL at 20:38

## 2022-01-28 RX ADMIN — SODIUM CHLORIDE, PRESERVATIVE FREE 10 ML: 5 INJECTION INTRAVENOUS at 09:01

## 2022-01-28 RX ADMIN — MIRTAZAPINE 15 MG: 15 TABLET, FILM COATED ORAL at 20:39

## 2022-01-28 RX ADMIN — AMOXICILLIN AND CLAVULANATE POTASSIUM 1 TABLET: 875; 125 TABLET, FILM COATED ORAL at 20:38

## 2022-01-28 RX ADMIN — DOCUSATE SODIUM 50 MG AND SENNOSIDES 8.6 MG 2 TABLET: 8.6; 5 TABLET, FILM COATED ORAL at 09:01

## 2022-01-28 RX ADMIN — SODIUM CHLORIDE, PRESERVATIVE FREE 10 ML: 5 INJECTION INTRAVENOUS at 20:39

## 2022-01-28 RX ADMIN — DIPHENHYDRAMINE HYDROCHLORIDE AND LIDOCAINE HYDROCHLORIDE AND ALUMINUM HYDROXIDE AND MAGNESIUM HYDRO 10 ML: KIT at 05:42

## 2022-01-28 RX ADMIN — MIDODRINE HYDROCHLORIDE 10 MG: 5 TABLET ORAL at 11:21

## 2022-01-28 RX ADMIN — ENOXAPARIN SODIUM 60 MG: 60 INJECTION SUBCUTANEOUS at 01:42

## 2022-01-28 RX ADMIN — DIPHENHYDRAMINE HYDROCHLORIDE AND LIDOCAINE HYDROCHLORIDE AND ALUMINUM HYDROXIDE AND MAGNESIUM HYDRO 10 ML: KIT at 01:43

## 2022-01-28 RX ADMIN — ENOXAPARIN SODIUM 60 MG: 60 INJECTION SUBCUTANEOUS at 14:56

## 2022-01-28 RX ADMIN — PANTOPRAZOLE SODIUM 40 MG: 40 TABLET, DELAYED RELEASE ORAL at 05:42

## 2022-01-28 RX ADMIN — AMOXICILLIN AND CLAVULANATE POTASSIUM 1 TABLET: 875; 125 TABLET, FILM COATED ORAL at 09:01

## 2022-01-28 RX ADMIN — LIDOCAINE HYDROCHLORIDE 5 ML: 20 SOLUTION ORAL; TOPICAL at 14:56

## 2022-01-29 LAB
D DIMER PPP FEU-MCNC: 0.61 MG/L (FEU) (ref 0–0.59)
MAGNESIUM SERPL-MCNC: 2.2 MG/DL (ref 1.6–2.4)
POTASSIUM SERPL-SCNC: 4.5 MMOL/L (ref 3.5–5.2)
WHOLE BLOOD HOLD SPECIMEN: NORMAL

## 2022-01-29 PROCEDURE — 25010000002 ENOXAPARIN PER 10 MG: Performed by: INTERNAL MEDICINE

## 2022-01-29 PROCEDURE — 85379 FIBRIN DEGRADATION QUANT: CPT | Performed by: INTERNAL MEDICINE

## 2022-01-29 PROCEDURE — 83735 ASSAY OF MAGNESIUM: CPT | Performed by: INTERNAL MEDICINE

## 2022-01-29 PROCEDURE — 84132 ASSAY OF SERUM POTASSIUM: CPT | Performed by: INTERNAL MEDICINE

## 2022-01-29 PROCEDURE — 94799 UNLISTED PULMONARY SVC/PX: CPT

## 2022-01-29 PROCEDURE — 99233 SBSQ HOSP IP/OBS HIGH 50: CPT | Performed by: INTERNAL MEDICINE

## 2022-01-29 RX ORDER — DEXAMETHASONE 6 MG/1
6 TABLET ORAL
Status: DISCONTINUED | OUTPATIENT
Start: 2022-01-30 | End: 2022-02-06

## 2022-01-29 RX ADMIN — DEXAMETHASONE 6 MG: 6 TABLET ORAL at 08:59

## 2022-01-29 RX ADMIN — PANTOPRAZOLE SODIUM 40 MG: 40 TABLET, DELAYED RELEASE ORAL at 08:58

## 2022-01-29 RX ADMIN — Medication 5 MG: at 21:11

## 2022-01-29 RX ADMIN — ENOXAPARIN SODIUM 60 MG: 60 INJECTION SUBCUTANEOUS at 02:49

## 2022-01-29 RX ADMIN — ENOXAPARIN SODIUM 60 MG: 60 INJECTION SUBCUTANEOUS at 13:34

## 2022-01-29 RX ADMIN — MIDODRINE HYDROCHLORIDE 10 MG: 5 TABLET ORAL at 17:24

## 2022-01-29 RX ADMIN — SODIUM CHLORIDE, PRESERVATIVE FREE 10 ML: 5 INJECTION INTRAVENOUS at 09:00

## 2022-01-29 RX ADMIN — MIDODRINE HYDROCHLORIDE 10 MG: 5 TABLET ORAL at 13:34

## 2022-01-29 RX ADMIN — DOCUSATE SODIUM 50 MG AND SENNOSIDES 8.6 MG 2 TABLET: 8.6; 5 TABLET, FILM COATED ORAL at 21:00

## 2022-01-29 RX ADMIN — DIPHENHYDRAMINE HYDROCHLORIDE AND LIDOCAINE HYDROCHLORIDE AND ALUMINUM HYDROXIDE AND MAGNESIUM HYDRO 10 ML: KIT at 17:24

## 2022-01-29 RX ADMIN — SODIUM CHLORIDE, PRESERVATIVE FREE 10 ML: 5 INJECTION INTRAVENOUS at 21:00

## 2022-01-29 RX ADMIN — MIRTAZAPINE 15 MG: 15 TABLET, FILM COATED ORAL at 21:00

## 2022-01-29 RX ADMIN — MIDODRINE HYDROCHLORIDE 10 MG: 5 TABLET ORAL at 08:58

## 2022-01-29 RX ADMIN — DOCUSATE SODIUM 50 MG AND SENNOSIDES 8.6 MG 2 TABLET: 8.6; 5 TABLET, FILM COATED ORAL at 08:59

## 2022-01-29 RX ADMIN — DIPHENHYDRAMINE HYDROCHLORIDE AND LIDOCAINE HYDROCHLORIDE AND ALUMINUM HYDROXIDE AND MAGNESIUM HYDRO 10 ML: KIT at 13:34

## 2022-01-30 LAB
MAGNESIUM SERPL-MCNC: 2.2 MG/DL (ref 1.6–2.4)
POTASSIUM SERPL-SCNC: 4.2 MMOL/L (ref 3.5–5.2)
WHOLE BLOOD HOLD SPECIMEN: NORMAL

## 2022-01-30 PROCEDURE — 83735 ASSAY OF MAGNESIUM: CPT | Performed by: INTERNAL MEDICINE

## 2022-01-30 PROCEDURE — 99233 SBSQ HOSP IP/OBS HIGH 50: CPT | Performed by: INTERNAL MEDICINE

## 2022-01-30 PROCEDURE — 84132 ASSAY OF SERUM POTASSIUM: CPT | Performed by: INTERNAL MEDICINE

## 2022-01-30 PROCEDURE — 36415 COLL VENOUS BLD VENIPUNCTURE: CPT | Performed by: INTERNAL MEDICINE

## 2022-01-30 PROCEDURE — 94799 UNLISTED PULMONARY SVC/PX: CPT

## 2022-01-30 PROCEDURE — 94660 CPAP INITIATION&MGMT: CPT

## 2022-01-30 PROCEDURE — 25010000002 ENOXAPARIN PER 10 MG: Performed by: INTERNAL MEDICINE

## 2022-01-30 RX ADMIN — MIDODRINE HYDROCHLORIDE 10 MG: 5 TABLET ORAL at 08:15

## 2022-01-30 RX ADMIN — DOCUSATE SODIUM 50 MG AND SENNOSIDES 8.6 MG 2 TABLET: 8.6; 5 TABLET, FILM COATED ORAL at 20:38

## 2022-01-30 RX ADMIN — SODIUM CHLORIDE, PRESERVATIVE FREE 10 ML: 5 INJECTION INTRAVENOUS at 08:16

## 2022-01-30 RX ADMIN — DEXAMETHASONE 6 MG: 6 TABLET ORAL at 08:16

## 2022-01-30 RX ADMIN — PANTOPRAZOLE SODIUM 40 MG: 40 TABLET, DELAYED RELEASE ORAL at 08:16

## 2022-01-30 RX ADMIN — Medication 5 MG: at 20:37

## 2022-01-30 RX ADMIN — ENOXAPARIN SODIUM 60 MG: 60 INJECTION SUBCUTANEOUS at 05:01

## 2022-01-30 RX ADMIN — MIRTAZAPINE 15 MG: 15 TABLET, FILM COATED ORAL at 20:37

## 2022-01-30 RX ADMIN — SODIUM CHLORIDE, PRESERVATIVE FREE 10 ML: 5 INJECTION INTRAVENOUS at 20:38

## 2022-01-30 RX ADMIN — DIPHENHYDRAMINE HYDROCHLORIDE AND LIDOCAINE HYDROCHLORIDE AND ALUMINUM HYDROXIDE AND MAGNESIUM HYDRO 10 ML: KIT at 12:13

## 2022-01-30 RX ADMIN — ENOXAPARIN SODIUM 60 MG: 60 INJECTION SUBCUTANEOUS at 14:52

## 2022-01-30 RX ADMIN — DIPHENHYDRAMINE HYDROCHLORIDE AND LIDOCAINE HYDROCHLORIDE AND ALUMINUM HYDROXIDE AND MAGNESIUM HYDRO 10 ML: KIT at 16:50

## 2022-01-30 RX ADMIN — MIDODRINE HYDROCHLORIDE 10 MG: 5 TABLET ORAL at 16:49

## 2022-01-30 RX ADMIN — MIDODRINE HYDROCHLORIDE 10 MG: 5 TABLET ORAL at 12:13

## 2022-01-31 ENCOUNTER — APPOINTMENT (OUTPATIENT)
Dept: GENERAL RADIOLOGY | Facility: HOSPITAL | Age: 77
End: 2022-01-31

## 2022-01-31 LAB
D DIMER PPP FEU-MCNC: 0.62 MG/L (FEU) (ref 0–0.59)
MAGNESIUM SERPL-MCNC: 2.3 MG/DL (ref 1.6–2.4)
POTASSIUM SERPL-SCNC: 3.9 MMOL/L (ref 3.5–5.2)

## 2022-01-31 PROCEDURE — 84132 ASSAY OF SERUM POTASSIUM: CPT | Performed by: INTERNAL MEDICINE

## 2022-01-31 PROCEDURE — 94799 UNLISTED PULMONARY SVC/PX: CPT

## 2022-01-31 PROCEDURE — 99233 SBSQ HOSP IP/OBS HIGH 50: CPT | Performed by: INTERNAL MEDICINE

## 2022-01-31 PROCEDURE — 97110 THERAPEUTIC EXERCISES: CPT

## 2022-01-31 PROCEDURE — 97530 THERAPEUTIC ACTIVITIES: CPT

## 2022-01-31 PROCEDURE — 71045 X-RAY EXAM CHEST 1 VIEW: CPT

## 2022-01-31 PROCEDURE — 83735 ASSAY OF MAGNESIUM: CPT | Performed by: INTERNAL MEDICINE

## 2022-01-31 PROCEDURE — 25010000002 ENOXAPARIN PER 10 MG: Performed by: INTERNAL MEDICINE

## 2022-01-31 PROCEDURE — 85379 FIBRIN DEGRADATION QUANT: CPT | Performed by: INTERNAL MEDICINE

## 2022-01-31 RX ADMIN — DIPHENHYDRAMINE HYDROCHLORIDE AND LIDOCAINE HYDROCHLORIDE AND ALUMINUM HYDROXIDE AND MAGNESIUM HYDRO 10 ML: KIT at 16:40

## 2022-01-31 RX ADMIN — DIPHENHYDRAMINE HYDROCHLORIDE AND LIDOCAINE HYDROCHLORIDE AND ALUMINUM HYDROXIDE AND MAGNESIUM HYDRO 10 ML: KIT at 12:16

## 2022-01-31 RX ADMIN — MIDODRINE HYDROCHLORIDE 10 MG: 5 TABLET ORAL at 16:40

## 2022-01-31 RX ADMIN — ENOXAPARIN SODIUM 60 MG: 60 INJECTION SUBCUTANEOUS at 13:46

## 2022-01-31 RX ADMIN — DOCUSATE SODIUM 50 MG AND SENNOSIDES 8.6 MG 2 TABLET: 8.6; 5 TABLET, FILM COATED ORAL at 20:57

## 2022-01-31 RX ADMIN — MIRTAZAPINE 15 MG: 15 TABLET, FILM COATED ORAL at 20:57

## 2022-01-31 RX ADMIN — DOCUSATE SODIUM 50 MG AND SENNOSIDES 8.6 MG 2 TABLET: 8.6; 5 TABLET, FILM COATED ORAL at 08:55

## 2022-01-31 RX ADMIN — MIDODRINE HYDROCHLORIDE 10 MG: 5 TABLET ORAL at 08:55

## 2022-01-31 RX ADMIN — MAGNESIUM HYDROXIDE,ALUMINUM HYDROXICE,SIMETHICONE 15 ML: 240; 2400; 2400 SUSPENSION ORAL at 16:40

## 2022-01-31 RX ADMIN — ENOXAPARIN SODIUM 60 MG: 60 INJECTION SUBCUTANEOUS at 03:13

## 2022-01-31 RX ADMIN — SODIUM CHLORIDE, PRESERVATIVE FREE 10 ML: 5 INJECTION INTRAVENOUS at 08:55

## 2022-01-31 RX ADMIN — SODIUM CHLORIDE, PRESERVATIVE FREE 10 ML: 5 INJECTION INTRAVENOUS at 20:59

## 2022-01-31 RX ADMIN — MIDODRINE HYDROCHLORIDE 10 MG: 5 TABLET ORAL at 12:16

## 2022-01-31 RX ADMIN — DIPHENHYDRAMINE HYDROCHLORIDE AND LIDOCAINE HYDROCHLORIDE AND ALUMINUM HYDROXIDE AND MAGNESIUM HYDRO 10 ML: KIT at 03:14

## 2022-01-31 RX ADMIN — PANTOPRAZOLE SODIUM 40 MG: 40 TABLET, DELAYED RELEASE ORAL at 08:55

## 2022-01-31 RX ADMIN — DIPHENHYDRAMINE HYDROCHLORIDE AND LIDOCAINE HYDROCHLORIDE AND ALUMINUM HYDROXIDE AND MAGNESIUM HYDRO 10 ML: KIT at 20:58

## 2022-01-31 RX ADMIN — DEXAMETHASONE 6 MG: 6 TABLET ORAL at 08:55

## 2022-02-01 PROBLEM — E78.2 MIXED HYPERLIPIDEMIA: Status: ACTIVE | Noted: 2022-02-01

## 2022-02-01 LAB
ALBUMIN SERPL-MCNC: 2.5 G/DL (ref 3.5–5.2)
ALBUMIN/GLOB SERPL: 0.8 G/DL
ALP SERPL-CCNC: 135 U/L (ref 39–117)
ALT SERPL W P-5'-P-CCNC: 71 U/L (ref 1–33)
ANION GAP SERPL CALCULATED.3IONS-SCNC: 8 MMOL/L (ref 5–15)
AST SERPL-CCNC: 25 U/L (ref 1–32)
BASOPHILS # BLD AUTO: 0.1 10*3/MM3 (ref 0–0.2)
BASOPHILS NFR BLD AUTO: 0.8 % (ref 0–1.5)
BILIRUB SERPL-MCNC: 0.6 MG/DL (ref 0–1.2)
BUN SERPL-MCNC: 15 MG/DL (ref 8–23)
BUN/CREAT SERPL: 25 (ref 7–25)
CALCIUM SPEC-SCNC: 8.5 MG/DL (ref 8.6–10.5)
CHLORIDE SERPL-SCNC: 103 MMOL/L (ref 98–107)
CO2 SERPL-SCNC: 28 MMOL/L (ref 22–29)
CREAT SERPL-MCNC: 0.6 MG/DL (ref 0.57–1)
DEPRECATED RDW RBC AUTO: 49.4 FL (ref 37–54)
EOSINOPHIL # BLD AUTO: 0 10*3/MM3 (ref 0–0.4)
EOSINOPHIL NFR BLD AUTO: 0 % (ref 0.3–6.2)
ERYTHROCYTE [DISTWIDTH] IN BLOOD BY AUTOMATED COUNT: 16.5 % (ref 12.3–15.4)
ERYTHROCYTE [SEDIMENTATION RATE] IN BLOOD: 46 MM/HR (ref 0–30)
GFR SERPL CREATININE-BSD FRML MDRD: 97 ML/MIN/1.73
GLOBULIN UR ELPH-MCNC: 3 GM/DL
GLUCOSE SERPL-MCNC: 83 MG/DL (ref 65–99)
HCT VFR BLD AUTO: 34.7 % (ref 34–46.6)
HGB BLD-MCNC: 11.5 G/DL (ref 12–15.9)
LYMPHOCYTES # BLD AUTO: 1.5 10*3/MM3 (ref 0.7–3.1)
LYMPHOCYTES NFR BLD AUTO: 16.6 % (ref 19.6–45.3)
MAGNESIUM SERPL-MCNC: 2.1 MG/DL (ref 1.6–2.4)
MCH RBC QN AUTO: 28.5 PG (ref 26.6–33)
MCHC RBC AUTO-ENTMCNC: 33.2 G/DL (ref 31.5–35.7)
MCV RBC AUTO: 85.7 FL (ref 79–97)
MONOCYTES # BLD AUTO: 1.3 10*3/MM3 (ref 0.1–0.9)
MONOCYTES NFR BLD AUTO: 14.2 % (ref 5–12)
NEUTROPHILS NFR BLD AUTO: 6.3 10*3/MM3 (ref 1.7–7)
NEUTROPHILS NFR BLD AUTO: 68.4 % (ref 42.7–76)
NRBC BLD AUTO-RTO: 0.1 /100 WBC (ref 0–0.2)
PLATELET # BLD AUTO: 614 10*3/MM3 (ref 140–450)
PMV BLD AUTO: 8.6 FL (ref 6–12)
POTASSIUM SERPL-SCNC: 4.3 MMOL/L (ref 3.5–5.2)
PROT SERPL-MCNC: 5.5 G/DL (ref 6–8.5)
RBC # BLD AUTO: 4.05 10*6/MM3 (ref 3.77–5.28)
SODIUM SERPL-SCNC: 139 MMOL/L (ref 136–145)
WBC NRBC COR # BLD: 9.3 10*3/MM3 (ref 3.4–10.8)

## 2022-02-01 PROCEDURE — 94799 UNLISTED PULMONARY SVC/PX: CPT

## 2022-02-01 PROCEDURE — 85652 RBC SED RATE AUTOMATED: CPT

## 2022-02-01 PROCEDURE — 99233 SBSQ HOSP IP/OBS HIGH 50: CPT | Performed by: INTERNAL MEDICINE

## 2022-02-01 PROCEDURE — 80053 COMPREHEN METABOLIC PANEL: CPT

## 2022-02-01 PROCEDURE — 85025 COMPLETE CBC W/AUTO DIFF WBC: CPT

## 2022-02-01 PROCEDURE — 83735 ASSAY OF MAGNESIUM: CPT | Performed by: INTERNAL MEDICINE

## 2022-02-01 PROCEDURE — 36415 COLL VENOUS BLD VENIPUNCTURE: CPT

## 2022-02-01 PROCEDURE — 25010000002 ENOXAPARIN PER 10 MG: Performed by: INTERNAL MEDICINE

## 2022-02-01 PROCEDURE — 97110 THERAPEUTIC EXERCISES: CPT

## 2022-02-01 PROCEDURE — 97164 PT RE-EVAL EST PLAN CARE: CPT

## 2022-02-01 RX ADMIN — Medication 5 MG: at 20:52

## 2022-02-01 RX ADMIN — ENOXAPARIN SODIUM 60 MG: 60 INJECTION SUBCUTANEOUS at 14:39

## 2022-02-01 RX ADMIN — SODIUM CHLORIDE, PRESERVATIVE FREE 10 ML: 5 INJECTION INTRAVENOUS at 08:32

## 2022-02-01 RX ADMIN — PANTOPRAZOLE SODIUM 40 MG: 40 TABLET, DELAYED RELEASE ORAL at 08:31

## 2022-02-01 RX ADMIN — MIDODRINE HYDROCHLORIDE 10 MG: 5 TABLET ORAL at 17:48

## 2022-02-01 RX ADMIN — MIDODRINE HYDROCHLORIDE 10 MG: 5 TABLET ORAL at 08:31

## 2022-02-01 RX ADMIN — MIDODRINE HYDROCHLORIDE 10 MG: 5 TABLET ORAL at 12:46

## 2022-02-01 RX ADMIN — ENOXAPARIN SODIUM 60 MG: 60 INJECTION SUBCUTANEOUS at 02:38

## 2022-02-01 RX ADMIN — MIRTAZAPINE 15 MG: 15 TABLET, FILM COATED ORAL at 20:52

## 2022-02-01 RX ADMIN — DEXAMETHASONE 6 MG: 6 TABLET ORAL at 08:31

## 2022-02-01 RX ADMIN — DOCUSATE SODIUM 50 MG AND SENNOSIDES 8.6 MG 2 TABLET: 8.6; 5 TABLET, FILM COATED ORAL at 08:31

## 2022-02-01 RX ADMIN — SODIUM CHLORIDE, PRESERVATIVE FREE 10 ML: 5 INJECTION INTRAVENOUS at 20:52

## 2022-02-01 RX ADMIN — DIPHENHYDRAMINE HYDROCHLORIDE AND LIDOCAINE HYDROCHLORIDE AND ALUMINUM HYDROXIDE AND MAGNESIUM HYDRO 10 ML: KIT at 17:47

## 2022-02-01 RX ADMIN — DIPHENHYDRAMINE HYDROCHLORIDE AND LIDOCAINE HYDROCHLORIDE AND ALUMINUM HYDROXIDE AND MAGNESIUM HYDRO 10 ML: KIT at 12:46

## 2022-02-01 NOTE — PLAN OF CARE
Goal Outcome Evaluation:   Patient weaned down to 7L hf and tolerating well. Still weak but progressively getting stronger. Patient's appetite is improved and was able to sit in the chair for half of the shift.   Problem: Fall Injury Risk  Goal: Absence of Fall and Fall-Related Injury  Intervention: Promote Injury-Free Environment  Recent Flowsheet Documentation  Taken 2/1/2022 1429 by Flori Acuna, RN  Safety Promotion/Fall Prevention:   safety round/check completed   room organization consistent   nonskid shoes/slippers when out of bed  Taken 2/1/2022 1257 by Flori Acuna RN  Safety Promotion/Fall Prevention:   safety round/check completed   room organization consistent   nonskid shoes/slippers when out of bed  Taken 2/1/2022 1044 by Flori Acuna RN  Safety Promotion/Fall Prevention: safety round/check completed  Taken 2/1/2022 0845 by Flori Acuna RN  Safety Promotion/Fall Prevention:   safety round/check completed   room organization consistent   nonskid shoes/slippers when out of bed  Goal: Absence of Fall and Fall-Related Injury  Intervention: Promote Injury-Free Environment  Recent Flowsheet Documentation  Taken 2/1/2022 1429 by Flori Acuna RN  Safety Promotion/Fall Prevention:   safety round/check completed   room organization consistent   nonskid shoes/slippers when out of bed  Taken 2/1/2022 1257 by Flori Acuna RN  Safety Promotion/Fall Prevention:   safety round/check completed   room organization consistent   nonskid shoes/slippers when out of bed  Taken 2/1/2022 1044 by Flori Acuna RN  Safety Promotion/Fall Prevention: safety round/check completed  Taken 2/1/2022 0845 by Flori Acuna RN  Safety Promotion/Fall Prevention:   safety round/check completed   room organization consistent   nonskid shoes/slippers when out of bed  Goal: Absence of Fall and Fall-Related Injury  Intervention: Promote Injury-Free Environment  Recent Flowsheet Documentation  Taken 2/1/2022 1429 by Flori Acuna RN  Safety  Promotion/Fall Prevention:   safety round/check completed   room organization consistent   nonskid shoes/slippers when out of bed  Taken 2/1/2022 1257 by Flori Acuna RN  Safety Promotion/Fall Prevention:   safety round/check completed   room organization consistent   nonskid shoes/slippers when out of bed  Taken 2/1/2022 1044 by Flori Acuna RN  Safety Promotion/Fall Prevention: safety round/check completed  Taken 2/1/2022 0845 by Flori Acuna RN  Safety Promotion/Fall Prevention:   safety round/check completed   room organization consistent   nonskid shoes/slippers when out of bed  Goal: Absence of Fall and Fall-Related Injury  Intervention: Promote Injury-Free Environment  Recent Flowsheet Documentation  Taken 2/1/2022 1429 by Flori Acuna RN  Safety Promotion/Fall Prevention:   safety round/check completed   room organization consistent   nonskid shoes/slippers when out of bed  Taken 2/1/2022 1257 by Flori Acuna RN  Safety Promotion/Fall Prevention:   safety round/check completed   room organization consistent   nonskid shoes/slippers when out of bed  Taken 2/1/2022 1044 by Flori Acuna RN  Safety Promotion/Fall Prevention: safety round/check completed  Taken 2/1/2022 0845 by Flori Acuna RN  Safety Promotion/Fall Prevention:   safety round/check completed   room organization consistent   nonskid shoes/slippers when out of bed  Goal: Absence of Fall and Fall-Related Injury  Intervention: Promote Injury-Free Environment  Recent Flowsheet Documentation  Taken 2/1/2022 1429 by Flori Acuna RN  Safety Promotion/Fall Prevention:   safety round/check completed   room organization consistent   nonskid shoes/slippers when out of bed  Taken 2/1/2022 1257 by Flori Acuna RN  Safety Promotion/Fall Prevention:   safety round/check completed   room organization consistent   nonskid shoes/slippers when out of bed  Taken 2/1/2022 1044 by Flori Acuna RN  Safety Promotion/Fall Prevention: safety round/check  completed  Taken 2/1/2022 0845 by Flori Acuna, RN  Safety Promotion/Fall Prevention:   safety round/check completed   room organization consistent   nonskid shoes/slippers when out of bed

## 2022-02-01 NOTE — CONSULTS
Nutrition Services    Patient Name: Nila Jin  YOB: 1945  MRN: [id number]  Admission date: 1/17/2022    Comment:  -- Home supply of Boost Plus Boost Plus 6 x per day (provides 2160 kcals, 84 g protein if consumed)       PPE Documentation        PPE Worn By Provider gloves, face shield, gown and respirator   PPE Worn By Patient  None     CLINICAL NUTRITION ASSESSMENT      Reason for Assessment Follow up protocol   1/26: LOS x 9 days     H&P  Presented to the ER at [facility] on 1/17/2022 complaining of dizziness.    Past Medical History:   Diagnosis Date   • Acid reflux    • Adenomatous colon polyp 4/24/2013   • Anxiety 1/17/2022   • Chronic nonseasonal allergic rhinitis due to pollen 9/18/2018   • COVID-19 vaccination declined    • Hypertriglyceridemia 1/17/2022   • IBS (irritable bowel syndrome) 1/17/2022   • Myeloproliferative disorder (HCC) 11/01/2018   • Primary insomnia 9/12/2016   • Primary osteoarthritis involving multiple joints 9/12/2016   • Tobacco abuse        Past Surgical History:   Procedure Laterality Date   • APPENDECTOMY     • BREAST IMPLANT SURGERY Bilateral    • TONSILLECTOMY          Current Problems   Pneumonia due to COVID-19 virus  Acute hypoxemic respiratory failure due to COVID-19     Dizziness    Acute UTI (urinary tract infection)    Dehydration    Acid reflux    Tobacco abuse       Encounter Information        Trending Narrative     2/1: RD visited patient at bedside.  Patient states she is eating well, appetite much better than last full RD review.  No N/V, no chewing/swallowing issues noted.  RD still encouraged Boost 3-4 times per day.   at bedside asked about Boost Plus vs. Odom Shake (bothe home supply at bedside) and RD encouraged Boost Plus noting it has 200 extra calories per serving.  Patient has a naturally small frame.      1/26: RD visited patient at bedside.  Patient states no N/V, no chewing/swallowing issues noted, decreased appetite,  "does not know if she has lost weight or not.   at bedside alerts RD of large amount of Boost Plus, applesauce, Yahoo, gatorade on shelf in room.  Reports patient drank 2 Boost, 1 Yahoo and 1 Gatorade yesterday.  RD recommended a goal of 4-5 Boost Plus per day, encouraged patient and visitor of this.       Anthropometrics        Current Height, Weight Height: 170.2 cm (67\")  Weight: 59.5 kg (131 lb 2.8 oz) (02/01/22 0500)       Ideal Body Weight (IBW) 136#   Usual Body Weight (UBW) 145# per patient        Trending Weight Hx     This admission: 2/1: 7.7% weight loss since last review (-2.6 L per I/Os)  1/26: 143-153# range (125# was \"estimated\")             PTA: No weight history chart review     Wt Readings from Last 30 Encounters:   02/01/22 0500 59.5 kg (131 lb 2.8 oz)   01/31/22 0553 60.7 kg (133 lb 13.1 oz)   01/30/22 0507 63.8 kg (140 lb 10.5 oz)   01/29/22 0548 63.4 kg (139 lb 12.4 oz)   01/28/22 0541 65.2 kg (143 lb 11.8 oz)   01/26/22 0510 64.7 kg (142 lb 10.2 oz)   01/25/22 0500 65.8 kg (145 lb 1 oz)   01/24/22 0500 65.6 kg (144 lb 10 oz)   01/23/22 0420 67 kg (147 lb 11.3 oz)   01/22/22 0343 67.8 kg (149 lb 7.6 oz)   01/21/22 2059 67.8 kg (149 lb 7.6 oz)   01/21/22 0341 68.6 kg (151 lb 3.8 oz)   01/20/22 0443 66.1 kg (145 lb 11.6 oz)   01/19/22 0338 65.2 kg (143 lb 11.8 oz)   01/18/22 0327 69.6 kg (153 lb 7 oz)   01/17/22 1939 67.5 kg (148 lb 13 oz)   01/17/22 0552 56.7 kg (125 lb)      BMI kg/m2 Body mass index is 20.54 kg/m².       Labs        Pertinent Labs    Results from last 7 days   Lab Units 02/01/22  0314 01/31/22  0335 01/30/22  0452 01/28/22  0451 01/27/22  0429   SODIUM mmol/L 139  --   --   --  140   POTASSIUM mmol/L 4.3 3.9 4.2   < > 4.7   CHLORIDE mmol/L 103  --   --   --  107   CO2 mmol/L 28.0  --   --   --  25.0   BUN mg/dL 15  --   --   --  16   CREATININE mg/dL 0.60  --   --   --  0.42*   CALCIUM mg/dL 8.5*  --   --   --  8.2*   BILIRUBIN mg/dL 0.6  --   --   --   --    ALK PHOS " U/L 135*  --   --   --   --    ALT (SGPT) U/L 71*  --   --   --   --    AST (SGOT) U/L 25  --   --   --   --    GLUCOSE mg/dL 83  --   --   --  117*    < > = values in this interval not displayed.     Results from last 7 days   Lab Units 02/01/22  0314 01/31/22  0335 01/30/22  0452 01/27/22  0429   MAGNESIUM mg/dL 2.1 2.3 2.2  --    HEMOGLOBIN g/dL 11.5*  --   --    < >   HEMATOCRIT % 34.7  --   --    < >    < > = values in this interval not displayed.     COVID19   Date Value Ref Range Status   01/17/2022 Detected (C) Not Detected - Ref. Range Final     No results found for: HGBA1C     Medications    Scheduled Medications dexamethasone, 6 mg, Oral, Daily With Breakfast  enoxaparin, 60 mg, Subcutaneous, Q12H  First Mouthwash (Magic Mouthwash), 10 mL, Swish & Spit, Q6H  midodrine, 10 mg, Oral, TID AC  mirtazapine, 15 mg, Oral, Nightly  pantoprazole, 40 mg, Oral, QAM  senna-docusate sodium, 2 tablet, Oral, BID  sodium chloride, 10 mL, Intravenous, Q12H        Infusions      PRN Medications •  acetaminophen **OR** acetaminophen **OR** acetaminophen  •  albuterol sulfate HFA  •  aluminum-magnesium hydroxide-simethicone  •  benzonatate  •  senna-docusate sodium **AND** polyethylene glycol **AND** bisacodyl **AND** bisacodyl  •  Lidocaine Viscous HCl  •  magnesium sulfate **OR** magnesium sulfate in D5W 1g/100mL (PREMIX)  •  meclizine  •  melatonin  •  nitroglycerin  •  ondansetron **OR** ondansetron  •  potassium chloride  •  potassium chloride  •  potassium phosphate infusion greater than 15 mMoles **OR** potassium phosphate infusion greater than 15 mMoles **OR** potassium phosphate **OR** sodium phosphate IVPB **OR** sodium phosphate IVPB **OR** sodium phosphate IVPB  •  sodium chloride  •  sodium chloride  •  sodium chloride     Physical Findings        Trending Physical   Appearance, NFPE   2/1: NFPE completed and continues to be not consistent with malnutrition at this time.  Continues to be at risk related to  documented PO intakes less that 50% and documented weight loss since admission.   1/26: NFPE completed and not consistent with malnutrition at this time   --  Edema  No edema documented      Bowel Function Last BM 1/26 (x 6 days) - pericolace BID, other bowel regimen PRN     Tubes No feeding tube      Chewing/Swallowing No issues per patient      Skin Intact     --  Current Nutrition Orders & Evaluation of Intake       Oral Nutrition     Food Allergies NKFA   Current PO Diet Diet Regular   Supplement None ordered    PO Evaluation     Trending % PO Intake   2/1: 47% average po intakes x last 4 days   1/26: 0-25% average po intakes since admission, drinking Boost from home supply   --  Nutritional Risk Screening        NRS-2002 Score          Nutrition Diagnosis         Nutrition Dx Problem 1 Inadequate oral intake related to decreased appetite and increased oxygen demands in the presence of COVID-19 as evidenced by PO intakes less than 25% since admission.    Nutrition Dx Problem 2        Intervention Goal         Intervention Goal(s) PO intakes at least 50%     Nutrition Intervention        RD Action Continue to monitor for po intakes and supplement acceptance      Nutrition Prescription          Diet Prescription Regular   Supplement Prescription Home supply Boost Plus 6 times per day   --  Monitor/Evaluation        Monitor Per protocol, I&O, PO intake, Supplement intake, Pertinent labs, Weight, Skin status, GI status, Symptoms, POC/GOC, Swallow function     Electronically signed by:  Sophie Kaplan RD  02/01/22 11:34 EST

## 2022-02-01 NOTE — CASE MANAGEMENT/SOCIAL WORK
Continued Stay Note   [facility]     Patient Name: Nila Jin  MRN: [id number]  Today's Date: 2/1/2022    Admit Date: 1/17/2022     Discharge Plan     Row Name 02/01/22 1545       Plan    Plan DC Plan: Declined inpt rehab. Anticipate routine home with family and [facility] (acccepted). Watch O2 needs, may require walking oximetry to be performed 24-48 hrs prior to dc.    Plan Comments Received notification from [facility] liaison, Seema that they are accepting patient for  services.         Phone communication or documentation only - no physical contact with patient or family.    Berna Romano RN     Office Phone: [phone]  Office Cell: [phone]

## 2022-02-01 NOTE — PLAN OF CARE
Goal Outcome Evaluation:              Outcome Summary: Pt up in chair. On 15L HF nc and sats 96%.  Denies soa at present.  at bedside Will cont to monitor

## 2022-02-01 NOTE — PLAN OF CARE
Goal Outcome Evaluation:  Plan of Care Reviewed With: patient           Outcome Summary: Completed 14 day re-eval.  Pt is starting to make significant progress and is very motivated to improve her strength and mobiltiy.  She is getting OOB more often with A x1 and has decreasing O2 needs to 7L hi flow at rest.  Pt still desats with OOB activity this date with need to increase O2 to 12 L with activity.  Will take RW next session and try ambulation in room with portable O2.  Pts goal for home with HHPT and family assist, she has progressed so much at this point that would be appropriate. Will continue to follow 5x/week.

## 2022-02-01 NOTE — PROGRESS NOTES
[facility] Medicine Services Daily Progress Note    Patient Name: Nila Jin  : 1945  MRN: [id number]  Primary Care Physician:  Baudilio Roldan IV, MD  Date of admission: 2022      Subjective    From previous note and with minor changes.  Chief Complaint: COVID, diarrhea      Patient Reports denies any complaints.   Patient was moved moved to PCU for BiPAP several days ago.    ROS negative except as above      Objective      Vitals:   Temp:  [96.8 °F (36 °C)-98.3 °F (36.8 °C)] 98.3 °F (36.8 °C)  Heart Rate:  [66-81] 81  Resp:  [18-20] 20  BP: ()/(52-63) 95/62  Flow (L/min):  [11-65] 11       Vitals reviewed.  Stable.  HENT:      Head: Normocephalic.      Nose: Nose normal.      Mouth/Throat:      Mouth: Mucous membranes are moist.   Eyes:      Pupils: Pupils are equal, round, and reactive to light.   Cardiovascular:      Rate and Rhythm: Normal rate and regular rhythm.      Pulses: Normal pulses.      Heart sounds: Normal heart sounds.   Pulmonary:      Effort: Pulmonary effort is normal.      Breath sounds: mild rhonchi present.   Abdominal:      General: Abdomen is flat.      Palpations: Abdomen is soft.   Musculoskeletal:         General: Swelling present. Normal range of motion.      Cervical back: Normal range of motion.   Skin:     General: Skin is warm.      Capillary Refill: Capillary refill takes less than 2 seconds.   Neurological:      General: No focal deficit present.      Mental Status: She is alert and oriented to person, place, and time.   Psychiatric:         Mood and Affect: Flat affect.         Behavior: Behavior normal.     Result Review    Result Review:  I have personally reviewed the results from the time of this admission to 2022 12:26 EST and agree with these findings:  [x]  Laboratory  [x]  Microbiology  [x]  Radiology  []  EKG/Telemetry   []  Cardiology/Vascular   []  Pathology  []  Old records  []  Other:  Most notable findings  include:      Procedure Component Value Units Date/Time   XR Chest 1 View [[id number]] Alireza as Reviewed   Order Status: Completed Collected: 01/31/22 0852    Updated: 01/31/22 0855   Narrative:     DATE OF EXAM:   1/31/2022 8:43 AM       PROCEDURE:   XR CHEST 1 VW-       INDICATIONS:   COVID-19 pneumonia; U07.1-COVID-19; J12.82-Pneumonia due to coronavirus   disease 2019; E86.0-Dehydration; R42-Dizziness and giddiness;   N39.0-Urinary tract infection, site not specified; R09.02-Hypoxemia       COMPARISON:   01/27/2022       TECHNIQUE:   Single radiographic AP view of the chest was obtained.       FINDINGS:   Cardiac size remains normal. There is patchy airspace disease in the   left lung base. The vascular markings in the chest appear normal. No   pleural fluid is seen.       Impression:     Patchy airspace disease in the left lung base consistent with pneumonia.       Electronically Signed By-Reddy Marquez MD On:1/31/2022 8:53 AM   This report was finalized on [id number] by  Reddy Marquez MD.               Assessment/Plan      Brief Patient Summary:  Patient is a 76 y.o. female with a history of tobacco use disorder, IBS, myeloproliferative disorder, hyperlipidemia, primary insomnia, primary osteoarthritis, generalized anxiety disorder and chronic nonseasonal allergic rhinitis due to pollen and GERD who presented to the ER at [facility] on 1/17/2022 complaining of dizziness. The patient states she felt like she might pass out. She denies any chest pain. She reports some mild shortness of breath and a nonproductive cough. She states she initially started feeling bad 2-3 weeks ago. She reports fatigue, nausea, and some loose stools. She denies any exacerbating or alleviating factors. She has not been vaccinated for Covid. She does not wear home oxygen. She states she smokes 1 ppd, but hasn't smoked for the past week because she felt so bad.   Patient was seen in the ER and work-up revealed Covid pneumonia,  UTI, and dehydration. The patient was given IV fluids and ceftriaxone. She was also given Zofran and meclizine. Her oxygen saturation was initially in the mid 90s on room air, but then she started dropping down so she was placed on supplemental oxygen with improvement. She was admitted to the Hospitalist group for further treatment.        dexamethasone, 6 mg, Oral, Daily With Breakfast  enoxaparin, 60 mg, Subcutaneous, Q12H  First Mouthwash (Magic Mouthwash), 10 mL, Swish & Spit, Q6H  midodrine, 10 mg, Oral, TID AC  mirtazapine, 15 mg, Oral, Nightly  pantoprazole, 40 mg, Oral, QAM  senna-docusate sodium, 2 tablet, Oral, BID  sodium chloride, 10 mL, Intravenous, Q12H             Active Hospital Problems:  Active Hospital Problems    Diagnosis    • **Acute hypoxemic respiratory failure due to COVID-19 (HCC)    • Pneumonia due to COVID-19 virus    • Acute UTI (urinary tract infection)    • Mixed hyperlipidemia    • Adjustment disorder with depressed mood    • Dizziness    • Dehydration    • IBS (irritable bowel syndrome)    • GERD without esophagitis    • Tobacco use disorder    • Myeloproliferative disorder (HCC)    • Primary osteoarthritis involving multiple joints        Plan:   Pneumonia due to COVID-19 virus  Acute hypoxemic respiratory failure due to COVID-19   -baseline:  Room air  -currently on O2 high flow nasal cannula alternating with BiPAP in PCU  -titrate O2 to keep sat >92%  -Decadron x 10 days  -Remdesivir not indicated as patient's symptoms started 2-3 weeks ago  -Lovenox per VTE prophylaxis  -encourage IS  -enhanced droplet/contact isolation   -PRN albuterol and Tessalon Perles   - Check echocardiogram -no abnormalities  -Patient is now persistently febrile we will consult ID for assistance  -Hypoxemia despite high flow.  BiPAP ordered.    And moved to PCU on January 23.  She does seem clinically comfortable.  Pulmonary consult requested appreciate assistance     Dizziness  -likely secondary to  dehydration  -meclizine TID PRN, dose increased on January 21 for recurrent hypotension orthostatic in nature  Echo normal  Orthostatics positive  Midodrine started and doubled to 10 3 times daily  Continue  cc an hour     Acute UTI (urinary tract infection)  -urine culture sent  -Treated with ceftriaxone     Dehydration  -secondary to poor oral intake  -IVF     Acid reflux  -continue home medication once list verified      Tobacco abuse  -encourage cessation  -patient refused nicotine patch      Depressive symptoms  Psychiatry consulted  Appreciate their input     Fever tachycardia worsening hypoxemia  The patient was started on IV antibiotics      DVT prophylaxis:  Medical DVT prophylaxis orders are present.     CODE STATUS:    Code Status (Patient has no pulse and is not breathing): CPR (Attempt to Resuscitate)  Medical Interventions (Patient has pulse or is breathing): Full Support     Disposition: This wonderful patient may discharge in the next 24 to 48 hours depending on oxygen requirement.     I discussed the patient's findings and my recommendations with patient and family at bedside.     This patient has been examined wearing appropriate Personal Protective Equipment.   02/01/22      Electronically signed by Trevon Watson MD, FACP,  02/01/22, 12:26 EST.      [facility] Hospitalist Team

## 2022-02-01 NOTE — THERAPY RE-EVALUATION
Patient Name: Nila Jin  : 1945    MRN: [id number]                              Today's Date: 2022       Admit Date: 2022    Visit Dx:     ICD-10-CM ICD-9-CM   1. Pneumonia due to COVID-19 virus  U07.1 480.8    J12.82 079.89   2. Dehydration  E86.0 276.51   3. Vertigo  R42 780.4   4. Urinary tract infection without hematuria, site unspecified  N39.0 599.0   5. Hypoxia  R09.02 799.02     Patient Active Problem List   Diagnosis   • Pneumonia due to COVID-19 virus   • Acute hypoxemic respiratory failure due to COVID-19 (HCC)   • Dizziness   • Acute UTI (urinary tract infection)   • Dehydration   • GERD without esophagitis   • Tobacco use disorder   • Chronic nonseasonal allergic rhinitis due to pollen   • Anxiety   • Adenomatous colon polyp   • IBS (irritable bowel syndrome)   • Hypertriglyceridemia   • Myeloproliferative disorder (HCC)   • Primary insomnia   • Primary osteoarthritis involving multiple joints   • Adjustment disorder with depressed mood   • Mixed hyperlipidemia     Past Medical History:   Diagnosis Date   • Acid reflux    • Adenomatous colon polyp 2013   • Anxiety 2022   • Chronic nonseasonal allergic rhinitis due to pollen 2018   • COVID-19 vaccination declined    • Hypertriglyceridemia 2022   • IBS (irritable bowel syndrome) 2022   • Myeloproliferative disorder (HCC) 2018   • Primary insomnia 2016   • Primary osteoarthritis involving multiple joints 2016   • Tobacco abuse      Past Surgical History:   Procedure Laterality Date   • APPENDECTOMY     • BREAST IMPLANT SURGERY Bilateral    • TONSILLECTOMY        General Information     Row Name 22 1453          Physical Therapy Time and Intention    Document Type re-evaluation  -     Mode of Treatment physical therapy  -     Row Name 22 1453          General Information    Patient Profile Reviewed yes  -     Existing Precautions/Restrictions oxygen therapy device and L/min   -[facility] Name 02/01/22 1453          Living Environment    Lives With spouse  -[facility] Name 02/01/22 1453          Stairs Within Home, Primary    Number of Stairs, Within Home, Primary none  -[facility] Name 02/01/22 1453          Cognition    Orientation Status (Cognition) oriented x 4  -[facility] Name 02/01/22 1453          Safety Issues, Functional Mobility    Impairments Affecting Function (Mobility) shortness of breath; endurance/activity tolerance; strength  -           User Key  (r) = Recorded By, (t) = Taken By, (c) = Cosigned By    Initials Name Provider Type     Ade Mclain, PT Physical Therapist               Mobility     [facility] Name 02/01/22 1457          Bed Mobility    Bed Mobility supine-sit  -     Supine-Sit Barnes (Bed Mobility) modified independence  -     Assistive Device (Bed Mobility) head of bed elevated; bed rails  -[facility] Name 02/01/22 1457          Transfers    Comment (Transfers) CGA bed to bedside commode and BSC to chair  -[facility] Name 02/01/22 1457          Bed-Chair Transfer    Bed-Chair Barnes (Transfers) contact guard  -[facility] Name 02/01/22 1457          Sit-Stand Transfer    Sit-Stand Barnes (Transfers) minimum assist (75% patient effort); 1 person assist  -[facility] Name 02/01/22 1457          Gait/Stairs (Locomotion)    Barnes Level (Gait) not tested  -     Comment (Gait/Stairs) sats drop with increase in O2 during transfers, deferred ambulation attempt this date  -           User Key  (r) = Recorded By, (t) = Taken By, (c) = Cosigned By    Initials Name Provider Type     Ade Mclain, PT Physical Therapist               Obj/Interventions     [facility] Name 02/01/22 1458          Range of Motion Comprehensive    General Range of Motion no range of motion deficits identified  -[facility] Name 02/01/22 1458          Strength Comprehensive (MMT)    Comment, General Manual Muscle Testing (MMT) Assessment BUE/LE 4/5, decreased endurance   -[facility] Name 02/01/22 1458          Motor Skills    Therapeutic Exercise --  BUE/LE x 10-15 reps seated  -[facility] Name 02/01/22 1458          Balance    Balance Assessment standing static balance; standing dynamic balance  -     Static Standing Balance mild impairment; supported  -     Dynamic Standing Balance mild impairment; supported  -[facility] Name 02/01/22 1458          Sensory Assessment (Somatosensory)    Sensory Assessment (Somatosensory) sensation intact  -           User Key  (r) = Recorded By, (t) = Taken By, (c) = Cosigned By    Initials Name Provider Type     Ade Mclain PT Physical Therapist               Goals/Plan     [facility] Name 02/01/22 1503          Bed Mobility Goal 1 (PT)    Progress/Outcomes (Bed Mobility Goal 1, PT) goal met  -[facility]     Row Name 02/01/22 1503          Transfer Goal 1 (PT)    Progress/Outcome (Transfer Goal 1, PT) continuing progress toward goal; goal ongoing  -J[facility]     Row Name 02/01/22 1503          Gait Training Goal 1 (PT)    Distance (Gait Training Goal 1, PT) 100'  -     Progress/Outcome (Gait Training Goal 1, PT) goal revised this date  -           User Key  (r) = Recorded By, (t) = Taken By, (c) = Cosigned By    Initials Name Provider Type    Ade Nj PT Physical Therapist               Clinical Impression     [facility] Name 02/01/22 6414          Pain    Additional Documentation Pain Scale: Numbers Pre/Post-Treatment (Group)  -J[facility]     Row Name 02/01/22 1456          Pain Scale: Numbers Pre/Post-Treatment    Pretreatment Pain Rating 0/10 - no pain  -     Posttreatment Pain Rating 0/10 - no pain  -J[facility]     Row Name 02/01/22 1458          Plan of Care Review    Plan of Care Reviewed With patient  -     Outcome Summary Completed 14 day re-eval.  Pt is starting to make significant progress and is very motivated to improve her strength and mobiltiy.  She is getting OOB more often with A x1 and has decreasing O2 needs to 7L hi flow at rest.  Pt still  desats with OOB activity this date with need to increase O2 to 12 L with activity.  Will take RW next session and try ambulation in room with portable O2.  Pts goal for home with HHPT and family assist, she has progressed so much at this point that would be appropriate. Will continue to follow 5x/week.  -     Row Name 02/01/22 1459          Therapy Assessment/Plan (PT)    Rehab Potential (PT) good, to achieve stated therapy goals  -     Criteria for Skilled Interventions Met (PT) yes; skilled treatment is necessary  -     Row Name 02/01/22 1459          Vital Signs    Pre SpO2 (%) 98  -     O2 Delivery Pre Treatment hi-flow  7  -     Intra SpO2 (%) 86  -     O2 Delivery Intra Treatment hi-flow  had to increase to 12L for transfers  -     Post SpO2 (%) 92  -     O2 Delivery Post Treatment hi-flow  8, RN aware  -     Row Name 02/01/22 1455          Positioning and Restraints    Pre-Treatment Position in bed  -     Post Treatment Position chair  -     In Chair notified nsg; reclined; call light within reach; encouraged to call for assist; exit alarm on  -           User Key  (r) = Recorded By, (t) = Taken By, (c) = Cosigned By    Initials Name Provider Type     Ade Mclain PT Physical Therapist               Outcome Measures     Row Name 02/01/22 1504          Modified Marlene Scale    Modified Marlene Scale 4 - Moderately severe disability.  Unable to walk without assistance, and unable to attend to own bodily needs without assistance.  -           User Key  (r) = Recorded By, (t) = Taken By, (c) = Cosigned By    Initials Name Provider Type    Ade Nj PT Physical Therapist                             Physical Therapy Education                 Title: PT OT SLP Therapies (Done)     Topic: Physical Therapy (Done)     Point: Mobility training (Done)     Learning Progress Summary           Patient Acceptance, E,TB, VU by  at 2/1/2022 1504      Show all documentation for this point  (3)                             User Key     Initials Effective Dates Name Provider Type Discipline     06/16/21 -  Ade Mclain PT Physical Therapist PT              PT Recommendation and Plan  Planned Therapy Interventions (PT): bed mobility training, gait training, transfer training, ROM (range of motion), strengthening, patient/family education, stair training  Plan of Care Reviewed With: patient  Outcome Summary: Completed 14 day re-eval.  Pt is starting to make significant progress and is very motivated to improve her strength and mobiltiy.  She is getting OOB more often with A x1 and has decreasing O2 needs to 7L hi flow at rest.  Pt still desats with OOB activity this date with need to increase O2 to 12 L with activity.  Will take RW next session and try ambulation in room with portable O2.  Pts goal for home with HHPT and family assist, she has progressed so much at this point that would be appropriate. Will continue to follow 5x/week.     Time Calculation:    PT Charges     Row Name 02/01/22 1506             Time Calculation    Start Time 1420  -      Stop Time 1445  -      Time Calculation (min) 25 min  -      PT Received On 02/01/22  -      PT - Next Appointment 02/02/22  -      PT Goal Re-Cert Due Date 02/15/22  -              Time Calculation- PT    Total Timed Code Minutes- PT 10 minute(s)  -            User Key  (r) = Recorded By, (t) = Taken By, (c) = Cosigned By    Initials Name Provider Type     Ade Mclain PT Physical Therapist              Therapy Charges for Today     Code Description Service Date Service Provider Modifiers Qty    13102028010 HC PT THERAPEUTIC ACT EA 15 MIN 1/31/2022 Ade Mclain, PT GP 1    41156373119 HC PT THER PROC EA 15 MIN 1/31/2022 Ade Mclain, PT GP 1    90717124901 HC PT RE-EVAL ESTABLISHED PLAN 2 2/1/2022 Ade Mclain, PT GP 1    70493748078 HC PT THER PROC EA 15 MIN 2/1/2022 Ade Mclain, PT GP 1          PT G-Codes  Outcome Measure  Options: Modified [place]  Modified [place] Scale: 4 - Moderately severe disability.  Unable to walk without assistance, and unable to attend to own bodily needs without assistance.    Ade Mclain, PT  2/1/2022

## 2022-02-01 NOTE — PLAN OF CARE
Problem: Fall Injury Risk  Goal: Absence of Fall and Fall-Related Injury  Intervention: Identify and Manage Contributors to Fall Injury Risk  Recent Flowsheet Documentation  Taken 2/1/2022 0412 by Eva Kim RN  Medication Review/Management: medications reviewed  Taken 2/1/2022 0200 by Eva Kim RN  Medication Review/Management: medications reviewed  Taken 2/1/2022 0037 by Eva Kim RN  Medication Review/Management: medications reviewed  Self-Care Promotion: independence encouraged  Intervention: Promote Injury-Free Environment  Recent Flowsheet Documentation  Taken 2/1/2022 0412 by Eva Kim RN  Safety Promotion/Fall Prevention: safety round/check completed  Taken 2/1/2022 0200 by Eva Kim RN  Safety Promotion/Fall Prevention: safety round/check completed  Taken 2/1/2022 0037 by Eva Kim RN  Safety Promotion/Fall Prevention: safety round/check completed  Goal: Absence of Fall and Fall-Related Injury  Intervention: Identify and Manage Contributors to Fall Injury Risk  Recent Flowsheet Documentation  Taken 2/1/2022 0412 by Eva Kim RN  Medication Review/Management: medications reviewed  Taken 2/1/2022 0200 by Eva Kim RN  Medication Review/Management: medications reviewed  Taken 2/1/2022 0037 by Eva Kim RN  Medication Review/Management: medications reviewed  Self-Care Promotion: independence encouraged  Intervention: Promote Injury-Free Environment  Recent Flowsheet Documentation  Taken 2/1/2022 0412 by Eva Kim RN  Safety Promotion/Fall Prevention: safety round/check completed  Taken 2/1/2022 0200 by Eva Kim RN  Safety Promotion/Fall Prevention: safety round/check completed  Taken 2/1/2022 0037 by Eva Kim RN  Safety Promotion/Fall Prevention: safety round/check completed  Goal: Absence of Fall and Fall-Related Injury  Intervention: Identify and Manage Contributors to Fall  Injury Risk  Recent Flowsheet Documentation  Taken 2/1/2022 0412 by Eva Kim RN  Medication Review/Management: medications reviewed  Taken 2/1/2022 0200 by Eva Kim RN  Medication Review/Management: medications reviewed  Taken 2/1/2022 0037 by Eva Kim RN  Medication Review/Management: medications reviewed  Self-Care Promotion: independence encouraged  Intervention: Promote Injury-Free Environment  Recent Flowsheet Documentation  Taken 2/1/2022 0412 by Eva Kim RN  Safety Promotion/Fall Prevention: safety round/check completed  Taken 2/1/2022 0200 by Eva Kim RN  Safety Promotion/Fall Prevention: safety round/check completed  Taken 2/1/2022 0037 by Eva Kim RN  Safety Promotion/Fall Prevention: safety round/check completed  Goal: Absence of Fall and Fall-Related Injury  Intervention: Identify and Manage Contributors to Fall Injury Risk  Recent Flowsheet Documentation  Taken 2/1/2022 0412 by Eva Kim RN  Medication Review/Management: medications reviewed  Taken 2/1/2022 0200 by Eva Kim RN  Medication Review/Management: medications reviewed  Taken 2/1/2022 0037 by Eva Kim RN  Medication Review/Management: medications reviewed  Self-Care Promotion: independence encouraged  Intervention: Promote Injury-Free Environment  Recent Flowsheet Documentation  Taken 2/1/2022 0412 by Eva Kim RN  Safety Promotion/Fall Prevention: safety round/check completed  Taken 2/1/2022 0200 by Eva Kim RN  Safety Promotion/Fall Prevention: safety round/check completed  Taken 2/1/2022 0037 by Eva Kim RN  Safety Promotion/Fall Prevention: safety round/check completed  Goal: Absence of Fall and Fall-Related Injury  Intervention: Identify and Manage Contributors to Fall Injury Risk  Recent Flowsheet Documentation  Taken 2/1/2022 0412 by Eva Kim RN  Medication Review/Management:  medications reviewed  Taken 2/1/2022 0200 by Eva Kim RN  Medication Review/Management: medications reviewed  Taken 2/1/2022 0037 by Eva Kim RN  Medication Review/Management: medications reviewed  Self-Care Promotion: independence encouraged  Intervention: Promote Injury-Free Environment  Recent Flowsheet Documentation  Taken 2/1/2022 0412 by Eva Kim, RN  Safety Promotion/Fall Prevention: safety round/check completed  Taken 2/1/2022 0200 by Eva Kim RN  Safety Promotion/Fall Prevention: safety round/check completed  Taken 2/1/2022 0037 by Eva Kim RN  Safety Promotion/Fall Prevention: safety round/check completed   Goal Outcome Evaluation:                 Problem: Skin Injury Risk Increased  Goal: Skin Health and Integrity  Outcome: Unable to Meet, Plan Revised     Problem: Fall Injury Risk  Goal: Absence of Fall and Fall-Related Injury  Outcome: Unable to Meet, Plan Revised     Problem: Adult Inpatient Plan of Care  Goal: Plan of Care Review  Outcome: Unable to Meet, Plan Revised

## 2022-02-01 NOTE — PROGRESS NOTES
Daily Progress Note        Pneumonia due to COVID-19 virus    Acute hypoxemic respiratory failure due to COVID-19 (HCC)    Dizziness    Acute UTI (urinary tract infection)    Dehydration    Acid reflux    Tobacco abuse    Adjustment disorder with depressed mood      Assessment    Pneumonia due to COVID-19 virus  Acute hypoxemic respiratory failure: ABGs 1/23/2022, pH 7.49, PCO2 33.1, PO2 53.4   sepsis: Fever with increasing C-reactive protein and worsening bilateral pulmonary infiltrates on 1/22/2022: New bacterial pneumonia due to nonspecific pathogen  Increasing D-dimer  UTI  Hypotension and dehydration  Tobacco smoking     Echo 1/21/2022  -EF 61-65%     Blood cultures negative at 24 hours  MRSA negative     Venous lower extremity Doppler 1/24/2022  -Negative     Plan     Continue to titrate oxygen, BiPAP as needed  -Currently on  15 L high flow , decreased from 45/64% airvo    Decadron 6 mg daily  Midodrine  Inhaled corticosteroids  Bronchodilators  Electrolyte/Glycemic control  DVT/GI Prophylaxis-Lovenox and Protonix    Physical therapy following-recommends inpatient rehab    Completed course of Rocephin for urinary tract infection    Patient out of the window for remdesivir     1/17/2022        COVID-19 LAB PANEL     COVID-19 test result  COVID19   Date Value Ref Range Status   01/17/2022 Detected (C) Not Detected - Ref. Range Final       COVID-19 Prognostic lab results  WBC with differential  Results from last 7 days   Lab Units 02/01/22  0314 01/27/22  0429   WBC 10*3/mm3 9.30 7.80   PLATELETS 10*3/mm3 614* 459*   NEUTROPHIL % % 68.4  --    LYMPHOCYTE % % 16.6*  --    NEUTROS ABS 10*3/mm3 6.30  --    LYMPHS ABS 10*3/mm3 1.50  --      Inflammatory markers  Results from last 7 days   Lab Units 02/01/22  0314 01/31/22  0335 01/29/22  0446 01/27/22  0429   D DIMER QUANT mg/L (FEU)  --  0.62* 0.61* 0.77*   ALK PHOS U/L 135*  --   --   --    AST (SGOT) U/L 25  --   --   --    ALT (SGPT) U/L 71*  --   --   --         Poor COVID-19 outcomes associated with:  Neutrophil:Lymphocyte ratio >3.5  Thrombocytopenia, lymphopenia  LFT's >5x upper limit of normal  LDH >400     LOS: 15 days     Subjective         Objective     Vital signs for last 24 hours:  Vitals:    01/31/22 2237 02/01/22 0229 02/01/22 0500 02/01/22 0543   BP: 123/63 112/58  96/52   BP Location: Right arm Right arm  Right arm   Patient Position: Lying Lying  Lying   Pulse: 66 77  79   Resp: 18 18  20   Temp: 96.8 °F (36 °C) 97 °F (36.1 °C)  98.1 °F (36.7 °C)   TempSrc: Oral Oral  Oral   SpO2: 95% 97%  93%   Weight:   59.5 kg (131 lb 2.8 oz)    Height:           Intake/Output last 3 shifts:  I/O last 3 completed shifts:  In: 920 [P.O.:920]  Out: 1100 [Urine:1100]  Intake/Output this shift:  No intake/output data recorded.      Radiology  Imaging Results (Last 24 Hours)     Procedure Component Value Units Date/Time    XR Chest 1 View [[id number]] Collected: 01/31/22 0852     Updated: 01/31/22 0855    Narrative:      DATE OF EXAM:  1/31/2022 8:43 AM     PROCEDURE:  XR CHEST 1 VW-     INDICATIONS:  COVID-19 pneumonia; U07.1-COVID-19; J12.82-Pneumonia due to coronavirus  disease 2019; E86.0-Dehydration; R42-Dizziness and giddiness;  N39.0-Urinary tract infection, site not specified; R09.02-Hypoxemia     COMPARISON:  01/27/2022     TECHNIQUE:   Single radiographic AP view of the chest was obtained.     FINDINGS:  Cardiac size remains normal. There is patchy airspace disease in the  left lung base. The vascular markings in the chest appear normal. No  pleural fluid is seen.        Impression:      Patchy airspace disease in the left lung base consistent with pneumonia.     Electronically Signed By-Reddy Marquez MD On:1/31/2022 8:53 AM  This report was finalized on [id number] by  Reddy Marquez MD.          Labs:  Results from last 7 days   Lab Units 02/01/22  0314   WBC 10*3/mm3 9.30   HEMOGLOBIN g/dL 11.5*   HEMATOCRIT % 34.7   PLATELETS 10*3/mm3 614*     Results from last  7 days   Lab Units 02/01/22  0314   SODIUM mmol/L 139   POTASSIUM mmol/L 4.3   CHLORIDE mmol/L 103   CO2 mmol/L 28.0   BUN mg/dL 15   CREATININE mg/dL 0.60   CALCIUM mg/dL 8.5*   BILIRUBIN mg/dL 0.6   ALK PHOS U/L 135*   ALT (SGPT) U/L 71*   AST (SGOT) U/L 25   GLUCOSE mg/dL 83         Results from last 7 days   Lab Units 02/01/22  0314   ALBUMIN g/dL 2.50*             Results from last 7 days   Lab Units 02/01/22  0314   MAGNESIUM mg/dL 2.1                   Meds:   SCHEDULE  dexamethasone, 6 mg, Oral, Daily With Breakfast  enoxaparin, 60 mg, Subcutaneous, Q12H  First Mouthwash (Magic Mouthwash), 10 mL, Swish & Spit, Q6H  midodrine, 10 mg, Oral, TID AC  mirtazapine, 15 mg, Oral, Nightly  pantoprazole, 40 mg, Oral, QAM  senna-docusate sodium, 2 tablet, Oral, BID  sodium chloride, 10 mL, Intravenous, Q12H      Infusions     PRNs  •  acetaminophen **OR** acetaminophen **OR** acetaminophen  •  albuterol sulfate HFA  •  aluminum-magnesium hydroxide-simethicone  •  benzonatate  •  senna-docusate sodium **AND** polyethylene glycol **AND** bisacodyl **AND** bisacodyl  •  Lidocaine Viscous HCl  •  magnesium sulfate **OR** magnesium sulfate in D5W 1g/100mL (PREMIX)  •  meclizine  •  melatonin  •  nitroglycerin  •  ondansetron **OR** ondansetron  •  potassium chloride  •  potassium chloride  •  potassium phosphate infusion greater than 15 mMoles **OR** potassium phosphate infusion greater than 15 mMoles **OR** potassium phosphate **OR** sodium phosphate IVPB **OR** sodium phosphate IVPB **OR** sodium phosphate IVPB  •  sodium chloride  •  sodium chloride  •  sodium chloride    Physical Exam:  Physical Exam  Vitals reviewed.   Pulmonary:      Breath sounds: Rhonchi present.   Skin:     General: Skin is warm and dry.   Neurological:      Mental Status: She is alert.         ROS  Review of Systems   Respiratory: Positive for shortness of breath.      I have reviewed the patient's new clinical results    Electronically signed by  Dinora Judge, APRN

## 2022-02-02 PROBLEM — D89.831 CYTOKINE RELEASE SYNDROME, GRADE 1: Status: ACTIVE | Noted: 2022-02-02

## 2022-02-02 LAB
ALBUMIN SERPL-MCNC: 2.6 G/DL (ref 3.5–5.2)
ALBUMIN/GLOB SERPL: 0.9 G/DL
ALP SERPL-CCNC: 117 U/L (ref 39–117)
ALT SERPL W P-5'-P-CCNC: 52 U/L (ref 1–33)
ANION GAP SERPL CALCULATED.3IONS-SCNC: 9 MMOL/L (ref 5–15)
AST SERPL-CCNC: 21 U/L (ref 1–32)
BASOPHILS # BLD AUTO: 0 10*3/MM3 (ref 0–0.2)
BASOPHILS NFR BLD AUTO: 0.2 % (ref 0–1.5)
BILIRUB SERPL-MCNC: 0.4 MG/DL (ref 0–1.2)
BUN SERPL-MCNC: 13 MG/DL (ref 8–23)
BUN/CREAT SERPL: 23.6 (ref 7–25)
CALCIUM SPEC-SCNC: 8.5 MG/DL (ref 8.6–10.5)
CHLORIDE SERPL-SCNC: 105 MMOL/L (ref 98–107)
CO2 SERPL-SCNC: 25 MMOL/L (ref 22–29)
CREAT SERPL-MCNC: 0.55 MG/DL (ref 0.57–1)
D DIMER PPP FEU-MCNC: 0.57 MG/L (FEU) (ref 0–0.59)
DEPRECATED RDW RBC AUTO: 49.9 FL (ref 37–54)
EOSINOPHIL # BLD AUTO: 0 10*3/MM3 (ref 0–0.4)
EOSINOPHIL NFR BLD AUTO: 0 % (ref 0.3–6.2)
ERYTHROCYTE [DISTWIDTH] IN BLOOD BY AUTOMATED COUNT: 16.7 % (ref 12.3–15.4)
ERYTHROCYTE [SEDIMENTATION RATE] IN BLOOD: 45 MM/HR (ref 0–30)
GFR SERPL CREATININE-BSD FRML MDRD: 107 ML/MIN/1.73
GLOBULIN UR ELPH-MCNC: 2.8 GM/DL
GLUCOSE SERPL-MCNC: 98 MG/DL (ref 65–99)
HCT VFR BLD AUTO: 33.8 % (ref 34–46.6)
HGB BLD-MCNC: 11.3 G/DL (ref 12–15.9)
LYMPHOCYTES # BLD AUTO: 1.5 10*3/MM3 (ref 0.7–3.1)
LYMPHOCYTES NFR BLD AUTO: 15.1 % (ref 19.6–45.3)
MAGNESIUM SERPL-MCNC: 1.9 MG/DL (ref 1.6–2.4)
MCH RBC QN AUTO: 28.8 PG (ref 26.6–33)
MCHC RBC AUTO-ENTMCNC: 33.6 G/DL (ref 31.5–35.7)
MCV RBC AUTO: 85.9 FL (ref 79–97)
MONOCYTES # BLD AUTO: 1.4 10*3/MM3 (ref 0.1–0.9)
MONOCYTES NFR BLD AUTO: 14.3 % (ref 5–12)
NEUTROPHILS NFR BLD AUTO: 6.8 10*3/MM3 (ref 1.7–7)
NEUTROPHILS NFR BLD AUTO: 70.4 % (ref 42.7–76)
NRBC BLD AUTO-RTO: 0.1 /100 WBC (ref 0–0.2)
PLATELET # BLD AUTO: 584 10*3/MM3 (ref 140–450)
PMV BLD AUTO: 8.5 FL (ref 6–12)
POTASSIUM SERPL-SCNC: 4.5 MMOL/L (ref 3.5–5.2)
PROT SERPL-MCNC: 5.4 G/DL (ref 6–8.5)
RBC # BLD AUTO: 3.93 10*6/MM3 (ref 3.77–5.28)
SODIUM SERPL-SCNC: 139 MMOL/L (ref 136–145)
WBC NRBC COR # BLD: 9.7 10*3/MM3 (ref 3.4–10.8)

## 2022-02-02 PROCEDURE — 80053 COMPREHEN METABOLIC PANEL: CPT

## 2022-02-02 PROCEDURE — 85025 COMPLETE CBC W/AUTO DIFF WBC: CPT

## 2022-02-02 PROCEDURE — 97110 THERAPEUTIC EXERCISES: CPT

## 2022-02-02 PROCEDURE — 99233 SBSQ HOSP IP/OBS HIGH 50: CPT | Performed by: INTERNAL MEDICINE

## 2022-02-02 PROCEDURE — 85379 FIBRIN DEGRADATION QUANT: CPT | Performed by: INTERNAL MEDICINE

## 2022-02-02 PROCEDURE — 97116 GAIT TRAINING THERAPY: CPT

## 2022-02-02 PROCEDURE — 36415 COLL VENOUS BLD VENIPUNCTURE: CPT | Performed by: INTERNAL MEDICINE

## 2022-02-02 PROCEDURE — 83735 ASSAY OF MAGNESIUM: CPT | Performed by: INTERNAL MEDICINE

## 2022-02-02 PROCEDURE — 25010000002 ENOXAPARIN PER 10 MG: Performed by: INTERNAL MEDICINE

## 2022-02-02 PROCEDURE — 85652 RBC SED RATE AUTOMATED: CPT

## 2022-02-02 RX ADMIN — ENOXAPARIN SODIUM 60 MG: 60 INJECTION SUBCUTANEOUS at 01:51

## 2022-02-02 RX ADMIN — DEXAMETHASONE 6 MG: 6 TABLET ORAL at 08:02

## 2022-02-02 RX ADMIN — DOCUSATE SODIUM 50 MG AND SENNOSIDES 8.6 MG 2 TABLET: 8.6; 5 TABLET, FILM COATED ORAL at 08:02

## 2022-02-02 RX ADMIN — DIPHENHYDRAMINE HYDROCHLORIDE AND LIDOCAINE HYDROCHLORIDE AND ALUMINUM HYDROXIDE AND MAGNESIUM HYDRO 10 ML: KIT at 08:02

## 2022-02-02 RX ADMIN — SODIUM CHLORIDE, PRESERVATIVE FREE 10 ML: 5 INJECTION INTRAVENOUS at 21:54

## 2022-02-02 RX ADMIN — MIDODRINE HYDROCHLORIDE 10 MG: 5 TABLET ORAL at 16:53

## 2022-02-02 RX ADMIN — Medication 5 MG: at 21:54

## 2022-02-02 RX ADMIN — MIDODRINE HYDROCHLORIDE 10 MG: 5 TABLET ORAL at 08:03

## 2022-02-02 RX ADMIN — DIPHENHYDRAMINE HYDROCHLORIDE AND LIDOCAINE HYDROCHLORIDE AND ALUMINUM HYDROXIDE AND MAGNESIUM HYDRO 10 ML: KIT at 16:53

## 2022-02-02 RX ADMIN — SODIUM CHLORIDE, PRESERVATIVE FREE 10 ML: 5 INJECTION INTRAVENOUS at 08:03

## 2022-02-02 RX ADMIN — MIRTAZAPINE 15 MG: 15 TABLET, FILM COATED ORAL at 21:54

## 2022-02-02 RX ADMIN — PANTOPRAZOLE SODIUM 40 MG: 40 TABLET, DELAYED RELEASE ORAL at 08:02

## 2022-02-02 RX ADMIN — MIDODRINE HYDROCHLORIDE 10 MG: 5 TABLET ORAL at 10:47

## 2022-02-02 RX ADMIN — DIPHENHYDRAMINE HYDROCHLORIDE AND LIDOCAINE HYDROCHLORIDE AND ALUMINUM HYDROXIDE AND MAGNESIUM HYDRO 10 ML: KIT at 10:47

## 2022-02-02 RX ADMIN — DIPHENHYDRAMINE HYDROCHLORIDE AND LIDOCAINE HYDROCHLORIDE AND ALUMINUM HYDROXIDE AND MAGNESIUM HYDRO 10 ML: KIT at 22:00

## 2022-02-02 RX ADMIN — ENOXAPARIN SODIUM 60 MG: 60 INJECTION SUBCUTANEOUS at 14:19

## 2022-02-02 NOTE — PROGRESS NOTES
[facility] Medicine Services Daily Progress Note    Patient Name: Nila Jin  : 1945  MRN: [id number]  Primary Care Physician:  Baudilio Roldan IV, MD  Date of admission: 2022      Subjective    From previous note and with minor changes.  Chief Complaint: COVID, diarrhea      Patient Reports denies any complaints.   Patient was moved moved to PCU for BiPAP several days ago.    ROS negative except as above      Objective      Vitals:   Temp:  [97.4 °F (36.3 °C)-98.7 °F (37.1 °C)] 98 °F (36.7 °C)  Heart Rate:  [67-90] 67  Resp:  [16-20] 18  BP: ()/(43-58) 98/44  Flow (L/min):  [7-8] 7       Vitals reviewed.  Stable.  HENT:      Head: Normocephalic.      Nose: Nose normal.      Mouth/Throat:      Mouth: Mucous membranes are moist.   Eyes:      Pupils: Pupils are equal, round, and reactive to light.   Cardiovascular:      Rate and Rhythm: Normal rate and regular rhythm.      Pulses: Normal pulses.      Heart sounds: Normal heart sounds.   Pulmonary:      Effort: Pulmonary effort is normal.      Breath sounds: mild rhonchi present.   Abdominal:      General: Abdomen is flat.      Palpations: Abdomen is soft.   Musculoskeletal:         General: Swelling present. Normal range of motion.      Cervical back: Normal range of motion.   Skin:     General: Skin is warm.      Capillary Refill: Capillary refill takes less than 2 seconds.   Neurological:      General: No focal deficit present.      Mental Status: She is alert and oriented to person, place, and time.   Psychiatric:         Mood and Affect: Flat affect.         Behavior: Behavior normal.     Result Review    Result Review:  I have personally reviewed the results from the time of this admission to 2022 15:45 EST and agree with these findings:  [x]  Laboratory  [x]  Microbiology  [x]  Radiology  []  EKG/Telemetry   []  Cardiology/Vascular   []  Pathology  []  Old records  []  Other:  Most notable findings  include:      Procedure Component Value Units Date/Time   XR Chest 1 View [[id number]] Alireza as Reviewed   Order Status: Completed Collected: 01/31/22 0852    Updated: 01/31/22 0855   Narrative:     DATE OF EXAM:   1/31/2022 8:43 AM       PROCEDURE:   XR CHEST 1 VW-       INDICATIONS:   COVID-19 pneumonia; U07.1-COVID-19; J12.82-Pneumonia due to coronavirus   disease 2019; E86.0-Dehydration; R42-Dizziness and giddiness;   N39.0-Urinary tract infection, site not specified; R09.02-Hypoxemia       COMPARISON:   01/27/2022       TECHNIQUE:   Single radiographic AP view of the chest was obtained.       FINDINGS:   Cardiac size remains normal. There is patchy airspace disease in the   left lung base. The vascular markings in the chest appear normal. No   pleural fluid is seen.       Impression:     Patchy airspace disease in the left lung base consistent with pneumonia.       Electronically Signed By-Reddy Marquez MD On:1/31/2022 8:53 AM   This report was finalized on [id number] by  Reddy Marquez MD.               Assessment/Plan      Brief Patient Summary:  Patient is a 76 y.o. female with a history of hyperlipidemia, tobacco use disorder, IBS, myeloproliferative disorder, primary insomnia, primary osteoarthritis, generalized anxiety disorder and chronic nonseasonal allergic rhinitis due to pollen and GERD who presented to the ER at [facility] on 1/17/2022 complaining of dizziness. The patient states she felt like she might pass out. She denies any chest pain. She reports some mild shortness of breath and a nonproductive cough. She states she initially started feeling bad 2-3 weeks ago. She reports fatigue, nausea, and some loose stools. She denies any exacerbating or alleviating factors. She has not been vaccinated for Covid. She does not wear home oxygen. She states she smokes 1 ppd, but hasn't smoked for the past week because she felt so bad.   Patient was seen in the ER and work-up revealed Covid pneumonia,  UTI, and dehydration. The patient was given IV fluids and ceftriaxone. She was also given Zofran and meclizine. Her oxygen saturation was initially in the mid 90s on room air, but then she started dropping down so she was placed on supplemental oxygen with improvement. She was admitted to the Hospitalist group for further treatment.        dexamethasone, 6 mg, Oral, Daily With Breakfast  enoxaparin, 60 mg, Subcutaneous, Q12H  First Mouthwash (Magic Mouthwash), 10 mL, Swish & Spit, Q6H  midodrine, 10 mg, Oral, TID AC  mirtazapine, 15 mg, Oral, Nightly  pantoprazole, 40 mg, Oral, QAM  senna-docusate sodium, 2 tablet, Oral, BID  sodium chloride, 10 mL, Intravenous, Q12H             Active Hospital Problems:  Active Hospital Problems    Diagnosis    • **Acute hypoxemic respiratory failure due to COVID-19 (HCC)    • Pneumonia due to COVID-19 virus    • Acute UTI (urinary tract infection)    • Cytokine release syndrome, grade 1    • Mixed hyperlipidemia    • Adjustment disorder with depressed mood    • Dizziness    • Dehydration    • IBS (irritable bowel syndrome)    • GERD without esophagitis    • Tobacco use disorder    • Myeloproliferative disorder (HCC)    • Primary osteoarthritis involving multiple joints        Plan:   Pneumonia due to COVID-19 virus  Acute hypoxemic respiratory failure due to COVID-19   -baseline:  Room air  -currently on O2 high flow nasal cannula alternating with BiPAP in PCU  -titrate O2 to keep sat >92%  -Decadron x 10 days  -Remdesivir not indicated as patient's symptoms started 2-3 weeks ago  -Lovenox per VTE prophylaxis  -encourage IS  -enhanced droplet/contact isolation   -PRN albuterol and Tessalon Perles   - Check echocardiogram -no abnormalities  -Patient is now persistently febrile we will consult ID for assistance  -Hypoxemia despite high flow.  BiPAP ordered.    And moved to PCU on January 23.  She does seem clinically comfortable.  Pulmonary consult requested appreciate  assistance     Dizziness  -likely secondary to dehydration  -meclizine TID PRN, dose increased on January 21 for recurrent hypotension orthostatic in nature  Echo normal  Orthostatics positive  Midodrine started and doubled to 10 3 times daily  Continue  cc an hour     Acute UTI (urinary tract infection)  -urine culture sent  -Treated with ceftriaxone     Dehydration  -secondary to poor oral intake  -IVF     Acid reflux  -continue home medication once list verified      Tobacco abuse  -encourage cessation  -patient refused nicotine patch      Depressive symptoms  Psychiatry consulted  Appreciate their input     Fever tachycardia worsening hypoxemia  The patient was started on IV antibiotics      DVT prophylaxis:  Medical DVT prophylaxis orders are present.     CODE STATUS:    Code Status (Patient has no pulse and is not breathing): CPR (Attempt to Resuscitate)  Medical Interventions (Patient has pulse or is breathing): Full Support     Disposition: This wonderful patient may discharge in the next 24 to 48 hours depending on oxygen requirement.     I discussed the patient's findings and my recommendations with patient and family at bedside.     This patient has been examined wearing appropriate Personal Protective Equipment.   02/02/22      Electronically signed by Trevon Watson MD, FACP,  02/02/22, 15:45 EST.      [facility]ist [facility]

## 2022-02-02 NOTE — CASE MANAGEMENT/SOCIAL WORK
Continued Stay Note   [facility]     Patient Name: Nila Jin  MRN: [id number]  Today's Date: 2/2/2022    Admit Date: 1/17/2022     Discharge Plan     Row Name 02/02/22 1557       Plan    Plan DC Plan: Anticipate routine home with family and [facility]  (accepted). New home O2 and wheelchair referral to [facility].    Plan Comments DC orders noted. CM contacted bedside RN to clarify. RN reported that MD plans to discharge patient tomorrow, 2/3. 5L home O2 ordered by MD. CM discussed with [facility] castillo who reported she would deliver tomorrow. CM also [facility]ison[facility] of plans to dc tomorrow.              Expected Discharge Date and Time     Expected Discharge Date Expected Discharge Time    Feb 3, 2022         Phone communication or documentation only - no physical contact with patient or family.    Berna Romano RN     Office Phone: [phone]  Office Cell: [phone]

## 2022-02-02 NOTE — PLAN OF CARE
Goal Outcome Evaluation:  Plan of Care Reviewed With: patient         Assessment: Nila Jin presents with functional mobility impairments which indicate the need for skilled intervention. Continues steady progress with weaning of O2.  Short walks in room today using RW but required O2 up to 15L.  Goal for home with spouse and HHPT. Recommend wheelchair, pt has RW she can use at home.  Tolerating session today without incident. Will continue to follow and progress as tolerated.

## 2022-02-02 NOTE — DISCHARGE PLACEMENT REQUEST
"Nila Jin (76 y.o. Female)             Date of Birth Social Security Number Address Home Phone MRN    1945  [address] [place] [facility] [phone] [id number]    Congregation Marital Status             None        Admission Date Admission Type Admitting Provider Attending Provider Department, Room/Bed    1/17/22 Emergency Trevon Watson MD Olisa, Charles O, MD [facility], 2123/1    Discharge Date Discharge Disposition Discharge Destination                         Attending Provider: Trevon Watson MD    Allergies: Erythromycin, Tetracycline    Isolation: Enh Drop/Con   Infection: COVID (confirmed) (01/17/22)   Code Status: CPR   Advance Care Planning Activity    Ht: 170.2 cm (67\")   Wt: 61 kg (134 lb 7.7 oz)    Admission Cmt: None   Principal Problem: Acute hypoxemic respiratory failure due to COVID-19 ([facility]) [U07.1,J96.01]                 Active Insurance as of 1/17/2022     Primary Coverage     Payor Plan Insurance Group Employer/Plan Group    [facility] MEDICARE REPLACEMENT [facility] MEDICARE REPLACEMENT [id number]     Payor Plan Address Payor Plan Phone Number Payor Plan Fax Number Effective Dates    [address] [address]   1/1/2022 - None Entered    [facility] [id number]       Subscriber Name Subscriber Birth Date Member ID       Nila Jin 1945 [id number]                 Emergency Contacts      (Rel.) Home Phone Work Phone Mobile Phone    AMELIA GRIDER (Spouse) -- -- [phone]               History & Physical      Ellyn Lim APRN at 01/17/22 1104     Attestation signed by Rajendra Duncan MD at 01/18/22 1800    Agree with NP history physical assessment and plan.  Nice lady with COVID symptoms and shortness of breath on 3 L of oxygen.  Symptoms started several weeks ago hence no remdesivir.  The patient will be placed on steroids and supplemental oxygen as needed.  As needed Imodium.                      [employer] " [facility] Medicine Services      Patient Name: Nila Jin  : 1945  MRN: [id number]  Primary Care Physician:  Baudilio Roldan IV, MD  Date of admission: 2022      Subjective      Chief Complaint:  Dizziness    History of Present Illness: Nila Jin is a 76 y.o. female with a history of GERD who presented to the ER at [facility] on 2022 complaining of dizziness. The patient states she felt like she might pass out. She denies any chest pain. She reports some mild shortness of breath and a nonproductive cough. She states she initially started feeling bad 2-3 weeks ago. She reports fatigue, nausea, and some loose stools. She denies any exacerbating or alleviating factors. She has not been vaccinated for Covid. She does not wear home oxygen. She states she smokes 1 ppd, but hasn't smoked for the past week because she felt so bad.     Workup in the ER revealed Covid pneumonia, UTI, and dehydration. The patient was given IV fluids and ceftriaxone. She was also given Zofran and meclizine. Her oxygen saturation was initially in the mid 90s on room air, but then she started dropping down so she was placed on supplemental oxygen with improvement. She was admitted to the Hospitalist group for further treatment.       Review of Systems   Constitutional: Positive for malaise/fatigue. Negative for fever.   Cardiovascular: Negative for chest pain.   Respiratory: Positive for cough and shortness of breath.    Gastrointestinal: Positive for nausea. Negative for diarrhea and vomiting.   Genitourinary: Positive for hesitancy. Negative for dysuria.   Neurological: Positive for dizziness.   All other systems reviewed and are negative.       Personal History     Past Medical History:   Diagnosis Date   • Acid reflux    • Adenomatous colon polyp 2013   • Anxiety 2022   • Chronic nonseasonal allergic rhinitis due to pollen 2018   • COVID-19 vaccination declined    •  Hypertriglyceridemia 1/17/2022   • IBS (irritable bowel syndrome) 1/17/2022   • Myeloproliferative disorder (HCC) 11/01/2018   • Primary insomnia 9/12/2016   • Primary osteoarthritis involving multiple joints 9/12/2016   • Tobacco abuse        Past Surgical History:   Procedure Laterality Date   • APPENDECTOMY     • BREAST IMPLANT SURGERY Bilateral    • TONSILLECTOMY         Family History: Family history is unknown by patient. Otherwise pertinent FHx was reviewed and not pertinent to current issue.    Social History:  reports that she has been smoking cigarettes. She has a 25.00 pack-year smoking history. She has never used smokeless tobacco. She reports that she does not drink alcohol and does not use drugs.    Home Medications:  Prior to Admission Medications     Prescriptions Last Dose Informant Patient Reported? Taking?    omeprazole (priLOSEC) 40 MG capsule   Yes Yes    Take 40 mg by mouth Daily.        Allergies:  Allergies   Allergen Reactions   • Erythromycin Other (See Comments)     Blood in stool   • Tetracycline Nausea Only       Objective      Vitals:   Temp:  [98.3 °F (36.8 °C)] 98.3 °F (36.8 °C)  Heart Rate:  [75-89] 75  Resp:  [15-18] 15  BP: (121-136)/(70-79) 136/75  Flow (L/min):  [3-4] 3    Physical Exam  Vitals reviewed.   Constitutional:       Appearance: She is ill-appearing.   HENT:      Head: Normocephalic.   Eyes:      Extraocular Movements: Extraocular movements intact.      Conjunctiva/sclera: Conjunctivae normal.      Pupils: Pupils are equal, round, and reactive to light.   Cardiovascular:      Rate and Rhythm: Normal rate and regular rhythm.      Pulses: Normal pulses.   Pulmonary:      Effort: Pulmonary effort is normal.      Breath sounds: Decreased breath sounds present.      Comments: O2 @ 3 L per NC  Abdominal:      General: Bowel sounds are normal. There is no distension.      Palpations: Abdomen is soft.      Tenderness: There is no abdominal tenderness.   Musculoskeletal:          General: Normal range of motion.      Cervical back: Normal range of motion.      Right lower leg: No edema.      Left lower leg: No edema.   Skin:     General: Skin is warm and dry.      Capillary Refill: Capillary refill takes less than 2 seconds.   Neurological:      Mental Status: She is alert and oriented to person, place, and time.   Psychiatric:         Mood and Affect: Mood normal.         Behavior: Behavior normal.          Result Review    Result Review:  I have personally reviewed the results from the time of this admission to 1/17/2022 11:04 EST and agree with these findings:  [x]  Laboratory  [x]  Microbiology  [x]  Radiology  []  EKG/Telemetry   []  Cardiology/Vascular   []  Pathology  [x]  Old records  []  Other:    Most notable findings include:   WBC 4.10, Na 135, K 4.2, Mg 2.1, glucose 115, LFTs WNL    Covid-19 detected    UA abnormal and culture reflexed    CT head:  No acute findings    CXR:  Hazy opacities within left lung, concerning for pneumonia. Recommend follow-up CXR in 4-6 weeks to document resolution.      Assessment/Plan        Active Hospital Problems:  Active Hospital Problems    Diagnosis    • **Pneumonia due to COVID-19 virus    • Acute hypoxemic respiratory failure due to COVID-19 (HCC)    • Dizziness    • Acute UTI (urinary tract infection)    • Dehydration    • Acid reflux    • Tobacco abuse      Plan:     Pneumonia due to COVID-19 virus  Acute hypoxemic respiratory failure due to COVID-19   -baseline:  Room air  -currently on O2 @ 3 L per NC  -titrate O2 to keep sat >92%  -Decadron x 10 days  -Remdesivir not indicated as patient's symptoms started 2-3 weeks ago  -empiric antibiotics not indicated  -Lovenox per VTE prophylaxis  -encourage IS  -enhanced droplet/contact isolation   -PRN albuterol and Tessalon Perles     Dizziness  -likely secondary to dehydration  -meclizine TID PRN  -check serial troponin  -check orthostatic vitals    Acute UTI (urinary tract infection)  -urine  culture pending  -continue ceftriaxone and follow culture     Dehydration  -secondary to poor oral intake  -IVF    Acid reflux  -continue home medication once list verified     Tobacco abuse  -encourage cessation  -patient refused nicotine patch         DVT prophylaxis:  Medical DVT prophylaxis orders are present.    CODE STATUS:    Code Status (Patient has no pulse and is not breathing): CPR (Attempt to Resuscitate)  Medical Interventions (Patient has pulse or is breathing): Full Support    Admission Status:  I believe this patient meets inpatient status.    I discussed the patient's findings and my recommendations with patient and family.    This patient has been examined wearing appropriate Personal Protective Equipment. 01/17/22      Signature: Electronically signed by KARIN Black, 01/17/22, 11:26 AM EST.      Electronically signed by Rajendra Duncan MD at 01/18/22 1800

## 2022-02-02 NOTE — PROGRESS NOTES
Daily Progress Note        Acute hypoxemic respiratory failure due to COVID-19 (HCC)    Pneumonia due to COVID-19 virus    Dizziness    Acute UTI (urinary tract infection)    Dehydration    GERD without esophagitis    Tobacco use disorder    IBS (irritable bowel syndrome)    Myeloproliferative disorder (HCC)    Primary osteoarthritis involving multiple joints    Adjustment disorder with depressed mood    Mixed hyperlipidemia      Assessment    Pneumonia due to COVID-19 virus  Acute hypoxemic respiratory failure: ABGs 1/23/2022, pH 7.49, PCO2 33.1, PO2 53.4   sepsis: Fever with increasing C-reactive protein and worsening bilateral pulmonary infiltrates on 1/22/2022: New bacterial pneumonia due to nonspecific pathogen  Increasing D-dimer  UTI  Hypotension and dehydration  Tobacco smoking     Echo 1/21/2022  -EF 61-65%     Blood cultures negative at 24 hours  MRSA negative     Venous lower extremity Doppler 1/24/2022  -Negative     Plan     Continue to titrate oxygen, BiPAP as needed  -Currently on  8 L high flow , decreased from 45/64% airvo    Decadron 6 mg daily  Midodrine  Inhaled corticosteroids  Bronchodilators  Electrolyte/Glycemic control  DVT/GI Prophylaxis-Lovenox and Protonix    Completed course of Rocephin for urinary tract infection    Patient out of the window for remdesivir    Will need 6-minute walk prior to discharge     1/17/2022        COVID-19 LAB PANEL     COVID-19 test result  COVID19   Date Value Ref Range Status   01/17/2022 Detected (C) Not Detected - Ref. Range Final       COVID-19 Prognostic lab results  WBC with differential  Results from last 7 days   Lab Units 02/02/22 0452 02/01/22  0314 01/27/22  0429   WBC 10*3/mm3 9.70 9.30 7.80   PLATELETS 10*3/mm3 584* 614* 459*   NEUTROPHIL % % 70.4 68.4  --    LYMPHOCYTE % % 15.1* 16.6*  --    NEUTROS ABS 10*3/mm3 6.80 6.30  --    LYMPHS ABS 10*3/mm3 1.50 1.50  --      Inflammatory markers  Results from last 7 days   Lab Units 02/02/22 0452  02/01/22  0314 01/31/22  0335 01/29/22  0446 01/27/22  0429   D DIMER QUANT mg/L (FEU) 0.57  --  0.62* 0.61* 0.77*   ALK PHOS U/L 117 135*  --   --   --    AST (SGOT) U/L 21 25  --   --   --    ALT (SGPT) U/L 52* 71*  --   --   --        Poor COVID-19 outcomes associated with:  Neutrophil:Lymphocyte ratio >3.5  Thrombocytopenia, lymphopenia  LFT's >5x upper limit of normal  LDH >400     LOS: 16 days     Subjective         Objective     Vital signs for last 24 hours:  Vitals:    02/01/22 2025 02/01/22 2059 02/02/22 0153 02/02/22 0504   BP:  127/58 120/53 97/46   BP Location:  Right arm Right arm Right arm   Patient Position:  Lying Lying Lying   Pulse: 70 69 70 81   Resp: 18 18 16 16   Temp:  97.6 °F (36.4 °C) 98.7 °F (37.1 °C) 98.3 °F (36.8 °C)   TempSrc:  Oral Oral Oral   SpO2: 96% 95% 94% 93%   Weight:    61 kg (134 lb 7.7 oz)   Height:           Intake/Output last 3 shifts:  I/O last 3 completed shifts:  In: 1920 [P.O.:1920]  Out: 1275 [Urine:1275]  Intake/Output this shift:  No intake/output data recorded.      Radiology  Imaging Results (Last 24 Hours)     ** No results found for the last 24 hours. **          Labs:  Results from last 7 days   Lab Units 02/02/22  0452   WBC 10*3/mm3 9.70   HEMOGLOBIN g/dL 11.3*   HEMATOCRIT % 33.8*   PLATELETS 10*3/mm3 584*     Results from last 7 days   Lab Units 02/02/22  0452   SODIUM mmol/L 139   POTASSIUM mmol/L 4.5   CHLORIDE mmol/L 105   CO2 mmol/L 25.0   BUN mg/dL 13   CREATININE mg/dL 0.55*   CALCIUM mg/dL 8.5*   BILIRUBIN mg/dL 0.4   ALK PHOS U/L 117   ALT (SGPT) U/L 52*   AST (SGOT) U/L 21   GLUCOSE mg/dL 98         Results from last 7 days   Lab Units 02/02/22  0452 02/01/22  0314   ALBUMIN g/dL 2.60* 2.50*             Results from last 7 days   Lab Units 02/02/22  0452   MAGNESIUM mg/dL 1.9                   Meds:   SCHEDULE  dexamethasone, 6 mg, Oral, Daily With Breakfast  enoxaparin, 60 mg, Subcutaneous, Q12H  First Mouthwash (Magic Mouthwash), 10 mL, Swish &  Spit, Q6H  midodrine, 10 mg, Oral, TID AC  mirtazapine, 15 mg, Oral, Nightly  pantoprazole, 40 mg, Oral, QAM  senna-docusate sodium, 2 tablet, Oral, BID  sodium chloride, 10 mL, Intravenous, Q12H      Infusions     PRNs  •  acetaminophen **OR** acetaminophen **OR** acetaminophen  •  albuterol sulfate HFA  •  aluminum-magnesium hydroxide-simethicone  •  benzonatate  •  senna-docusate sodium **AND** polyethylene glycol **AND** bisacodyl **AND** bisacodyl  •  Lidocaine Viscous HCl  •  magnesium sulfate **OR** magnesium sulfate in D5W 1g/100mL (PREMIX)  •  meclizine  •  melatonin  •  nitroglycerin  •  ondansetron **OR** ondansetron  •  potassium chloride  •  potassium chloride  •  potassium phosphate infusion greater than 15 mMoles **OR** potassium phosphate infusion greater than 15 mMoles **OR** potassium phosphate **OR** sodium phosphate IVPB **OR** sodium phosphate IVPB **OR** sodium phosphate IVPB  •  sodium chloride  •  sodium chloride  •  sodium chloride    Physical Exam:  Physical Exam  Vitals reviewed.   Pulmonary:      Breath sounds: Rhonchi present.   Skin:     General: Skin is warm and dry.   Neurological:      Mental Status: She is alert.         ROS  Review of Systems   Respiratory: Positive for shortness of breath.      I have reviewed the patient's new clinical results    Electronically signed by KARIN Peres

## 2022-02-02 NOTE — PLAN OF CARE
Goal Outcome Evaluation:  Plan of Care Reviewed With: patient        Progress: improving  Outcome Summary: on 8L HF, continue to monitor

## 2022-02-02 NOTE — PLAN OF CARE
Goal Outcome Evaluation:         Pt has been weaning down on high flow O2 today.  Pt has been up in chair resting comfortably most of shift.  Will continue to monitor.

## 2022-02-02 NOTE — CASE MANAGEMENT/SOCIAL WORK
Continued Stay Note  KIRILL Amador     Patient Name: Nila Jin  MRN: [id number]  Today's Date: 2/2/2022    Admit Date: 1/17/2022     Discharge Plan     Row Name 02/02/22 1321       Plan    Plan DC Plan: Anticipate routine home with family and [facility]  (accepted). Watch O2 needs, may require walking oximetry to be performed 24-48 hrs prior to dc. New wheelchair requested, referral sent to [facility].    Plan Comments Discussed with PT that patient needed/wanted wheelchair to have at home. CM contacted MD to request wheelchair order and documentation. New referral sent in basket to [facility] and to [facility].              Phone communication or documentation only - no physical contact with patient or family.    Berna Romano RN     Office Phone: [phone]  Office Cell: [phone]

## 2022-02-03 LAB
ALBUMIN SERPL-MCNC: 2.6 G/DL (ref 3.5–5.2)
ALBUMIN/GLOB SERPL: 0.8 G/DL
ALP SERPL-CCNC: 123 U/L (ref 39–117)
ALT SERPL W P-5'-P-CCNC: 46 U/L (ref 1–33)
ANION GAP SERPL CALCULATED.3IONS-SCNC: 8 MMOL/L (ref 5–15)
AST SERPL-CCNC: 24 U/L (ref 1–32)
BASOPHILS # BLD AUTO: 0 10*3/MM3 (ref 0–0.2)
BASOPHILS NFR BLD AUTO: 0.2 % (ref 0–1.5)
BILIRUB SERPL-MCNC: 0.4 MG/DL (ref 0–1.2)
BUN SERPL-MCNC: 13 MG/DL (ref 8–23)
BUN/CREAT SERPL: 25.5 (ref 7–25)
CALCIUM SPEC-SCNC: 8.8 MG/DL (ref 8.6–10.5)
CHLORIDE SERPL-SCNC: 105 MMOL/L (ref 98–107)
CO2 SERPL-SCNC: 26 MMOL/L (ref 22–29)
CREAT SERPL-MCNC: 0.51 MG/DL (ref 0.57–1)
DEPRECATED RDW RBC AUTO: 51.2 FL (ref 37–54)
EOSINOPHIL # BLD AUTO: 0 10*3/MM3 (ref 0–0.4)
EOSINOPHIL NFR BLD AUTO: 0 % (ref 0.3–6.2)
ERYTHROCYTE [DISTWIDTH] IN BLOOD BY AUTOMATED COUNT: 17.2 % (ref 12.3–15.4)
ERYTHROCYTE [SEDIMENTATION RATE] IN BLOOD: 50 MM/HR (ref 0–30)
GFR SERPL CREATININE-BSD FRML MDRD: 117 ML/MIN/1.73
GLOBULIN UR ELPH-MCNC: 3.2 GM/DL
GLUCOSE SERPL-MCNC: 114 MG/DL (ref 65–99)
HCT VFR BLD AUTO: 34.5 % (ref 34–46.6)
HGB BLD-MCNC: 11.5 G/DL (ref 12–15.9)
LYMPHOCYTES # BLD AUTO: 2.2 10*3/MM3 (ref 0.7–3.1)
LYMPHOCYTES NFR BLD AUTO: 24.7 % (ref 19.6–45.3)
MAGNESIUM SERPL-MCNC: 2 MG/DL (ref 1.6–2.4)
MCH RBC QN AUTO: 28.6 PG (ref 26.6–33)
MCHC RBC AUTO-ENTMCNC: 33.2 G/DL (ref 31.5–35.7)
MCV RBC AUTO: 86.1 FL (ref 79–97)
MONOCYTES # BLD AUTO: 1.3 10*3/MM3 (ref 0.1–0.9)
MONOCYTES NFR BLD AUTO: 14.1 % (ref 5–12)
NEUTROPHILS NFR BLD AUTO: 5.5 10*3/MM3 (ref 1.7–7)
NEUTROPHILS NFR BLD AUTO: 61 % (ref 42.7–76)
NRBC BLD AUTO-RTO: 0 /100 WBC (ref 0–0.2)
PLATELET # BLD AUTO: 567 10*3/MM3 (ref 140–450)
PMV BLD AUTO: 8 FL (ref 6–12)
POTASSIUM SERPL-SCNC: 4.3 MMOL/L (ref 3.5–5.2)
PROT SERPL-MCNC: 5.8 G/DL (ref 6–8.5)
RBC # BLD AUTO: 4.01 10*6/MM3 (ref 3.77–5.28)
SODIUM SERPL-SCNC: 139 MMOL/L (ref 136–145)
WBC NRBC COR # BLD: 9 10*3/MM3 (ref 3.4–10.8)

## 2022-02-03 PROCEDURE — 99233 SBSQ HOSP IP/OBS HIGH 50: CPT | Performed by: INTERNAL MEDICINE

## 2022-02-03 PROCEDURE — 85025 COMPLETE CBC W/AUTO DIFF WBC: CPT

## 2022-02-03 PROCEDURE — 83735 ASSAY OF MAGNESIUM: CPT | Performed by: INTERNAL MEDICINE

## 2022-02-03 PROCEDURE — 85652 RBC SED RATE AUTOMATED: CPT

## 2022-02-03 PROCEDURE — 25010000002 ENOXAPARIN PER 10 MG: Performed by: INTERNAL MEDICINE

## 2022-02-03 PROCEDURE — 80053 COMPREHEN METABOLIC PANEL: CPT

## 2022-02-03 PROCEDURE — 94618 PULMONARY STRESS TESTING: CPT

## 2022-02-03 RX ADMIN — ENOXAPARIN SODIUM 60 MG: 60 INJECTION SUBCUTANEOUS at 01:22

## 2022-02-03 RX ADMIN — MIRTAZAPINE 15 MG: 15 TABLET, FILM COATED ORAL at 21:30

## 2022-02-03 RX ADMIN — DOCUSATE SODIUM 50 MG AND SENNOSIDES 8.6 MG 2 TABLET: 8.6; 5 TABLET, FILM COATED ORAL at 09:17

## 2022-02-03 RX ADMIN — DIPHENHYDRAMINE HYDROCHLORIDE AND LIDOCAINE HYDROCHLORIDE AND ALUMINUM HYDROXIDE AND MAGNESIUM HYDRO 10 ML: KIT at 09:16

## 2022-02-03 RX ADMIN — Medication 5 MG: at 21:30

## 2022-02-03 RX ADMIN — DEXAMETHASONE 6 MG: 6 TABLET ORAL at 09:17

## 2022-02-03 RX ADMIN — PANTOPRAZOLE SODIUM 40 MG: 40 TABLET, DELAYED RELEASE ORAL at 09:17

## 2022-02-03 RX ADMIN — MIDODRINE HYDROCHLORIDE 10 MG: 5 TABLET ORAL at 09:17

## 2022-02-03 RX ADMIN — MIDODRINE HYDROCHLORIDE 10 MG: 5 TABLET ORAL at 12:09

## 2022-02-03 RX ADMIN — DIPHENHYDRAMINE HYDROCHLORIDE AND LIDOCAINE HYDROCHLORIDE AND ALUMINUM HYDROXIDE AND MAGNESIUM HYDRO 10 ML: KIT at 17:51

## 2022-02-03 RX ADMIN — MIDODRINE HYDROCHLORIDE 10 MG: 5 TABLET ORAL at 17:52

## 2022-02-03 RX ADMIN — SODIUM CHLORIDE, PRESERVATIVE FREE 10 ML: 5 INJECTION INTRAVENOUS at 09:20

## 2022-02-03 RX ADMIN — ENOXAPARIN SODIUM 60 MG: 60 INJECTION SUBCUTANEOUS at 15:26

## 2022-02-03 RX ADMIN — SODIUM CHLORIDE, PRESERVATIVE FREE 10 ML: 5 INJECTION INTRAVENOUS at 21:30

## 2022-02-03 NOTE — PROGRESS NOTES
Exercise Oximetry    Patient Name:Nila Jin   MRN: [id number]   Date: 02/03/22             ROOM AIR BASELINE   SpO2% 92   Heart Rate    Blood Pressure      EXERCISE ON ROOM AIR SpO2% EXERCISE ON O2 @ 8 LPM SpO2%   1 MINUTE 87 1 MINUTE    2 MINUTES 82 2 MINUTES    3 MINUTES  3 MINUTES 90   4 MINUTES  4 MINUTES 89   5 MINUTES  5 MINUTES 89   6 MINUTES  6 MINUTES 91              Distance Walked   Distance Walked   Dyspnea (Dandy Scale)   Dyspnea (Dandy Scale)   Fatigue (Dandy Scale)   Fatigue (Dandy Scale)   SpO2% Post Exercise  91 SpO2% Post Exercise   HR Post Exercise   HR Post Exercise   Time to Recovery   Time to Recovery     Comments: Pt is requiring at this time 8lpm to maintain a saturation of 90%. Attempted a 6-min walk but unable to move around room. Did have patient sit on edge of bed and stand up and sit down, but could not maintain saturations on room air. In order to get patient saturations 90% patient required 8lpm. Will continue to titrate oxygen as tolerated but at this time unable to wean.

## 2022-02-03 NOTE — PLAN OF CARE
Goal Outcome Evaluation:  Plan of Care Reviewed With: patient        Progress: improving  Outcome Summary: pn 7L HF, continue to wean oxygen, possible discharge soon

## 2022-02-03 NOTE — PROGRESS NOTES
Daily Progress Note        Acute hypoxemic respiratory failure due to COVID-19 (HCC)    Pneumonia due to COVID-19 virus    Dizziness    Acute UTI (urinary tract infection)    Dehydration    GERD without esophagitis    Tobacco use disorder    IBS (irritable bowel syndrome)    Myeloproliferative disorder (HCC)    Primary osteoarthritis involving multiple joints    Adjustment disorder with depressed mood    Mixed hyperlipidemia    Cytokine release syndrome, grade 1      Assessment    Pneumonia due to COVID-19 virus  Acute hypoxemic respiratory failure: ABGs 1/23/2022, pH 7.49, PCO2 33.1, PO2 53.4   sepsis: Fever with increasing C-reactive protein and worsening bilateral pulmonary infiltrates on 1/22/2022: New bacterial pneumonia due to nonspecific pathogen  Increasing D-dimer  UTI  Hypotension and dehydration  Tobacco smoking     Echo 1/21/2022  -EF 61-65%     Blood cultures negative at 24 hours  MRSA negative     Venous lower extremity Doppler 1/24/2022  -Negative     Plan     Continue to titrate oxygen, BiPAP as needed  -Currently on  7L high flow , decreased from 45/64% airvo    Decadron 6 mg daily  Midodrine  Inhaled corticosteroids  Bronchodilators  Electrolyte/Glycemic control  DVT/GI Prophylaxis-Lovenox and Protonix    Completed course of Rocephin for urinary tract infection    Patient out of the window for remdesivir    Will need 6-minute walk prior to discharge     1/17/2022        COVID-19 LAB PANEL     COVID-19 test result  COVID19   Date Value Ref Range Status   01/17/2022 Detected (C) Not Detected - Ref. Range Final       COVID-19 Prognostic lab results  WBC with differential  Results from last 7 days   Lab Units 02/03/22  0413 02/02/22  0452 02/01/22  0314   WBC 10*3/mm3 9.00 9.70 9.30   PLATELETS 10*3/mm3 567* 584* 614*   NEUTROPHIL % % 61.0 70.4 68.4   LYMPHOCYTE % % 24.7 15.1* 16.6*   NEUTROS ABS 10*3/mm3 5.50 6.80 6.30   LYMPHS ABS 10*3/mm3 2.20 1.50 1.50     Inflammatory markers  Results from last 7  days   Lab Units 02/03/22  0413 02/02/22  0452 02/01/22  0314 01/31/22  0335 01/29/22  0446   D DIMER QUANT mg/L (FEU)  --  0.57  --  0.62* 0.61*   ALK PHOS U/L 123* 117 135*  --   --    AST (SGOT) U/L 24 21 25  --   --    ALT (SGPT) U/L 46* 52* 71*  --   --        Poor COVID-19 outcomes associated with:  Neutrophil:Lymphocyte ratio >3.5  Thrombocytopenia, lymphopenia  LFT's >5x upper limit of normal  LDH >400     LOS: 17 days     Subjective         Objective     Vital signs for last 24 hours:  Vitals:    02/02/22 2119 02/03/22 0124 02/03/22 0500 02/03/22 0511   BP: 120/53 116/63  99/61   BP Location: Right arm Right arm     Patient Position: Lying Lying     Pulse: 75 74  77   Resp: 18 16  16   Temp: 98 °F (36.7 °C) 98.2 °F (36.8 °C)  98.2 °F (36.8 °C)   TempSrc: Oral Oral  Oral   SpO2: 92% 91%  92%   Weight:   61.7 kg (136 lb 0.4 oz)    Height:           Intake/Output last 3 shifts:  I/O last 3 completed shifts:  In: 960 [P.O.:960]  Out: 2095 [Urine:2095]  Intake/Output this shift:  No intake/output data recorded.      Radiology  Imaging Results (Last 24 Hours)     ** No results found for the last 24 hours. **          Labs:  Results from last 7 days   Lab Units 02/03/22 0413   WBC 10*3/mm3 9.00   HEMOGLOBIN g/dL 11.5*   HEMATOCRIT % 34.5   PLATELETS 10*3/mm3 567*     Results from last 7 days   Lab Units 02/03/22 0413   SODIUM mmol/L 139   POTASSIUM mmol/L 4.3   CHLORIDE mmol/L 105   CO2 mmol/L 26.0   BUN mg/dL 13   CREATININE mg/dL 0.51*   CALCIUM mg/dL 8.8   BILIRUBIN mg/dL 0.4   ALK PHOS U/L 123*   ALT (SGPT) U/L 46*   AST (SGOT) U/L 24   GLUCOSE mg/dL 114*         Results from last 7 days   Lab Units 02/03/22  0413 02/02/22  0452 02/01/22  0314   ALBUMIN g/dL 2.60* 2.60* 2.50*             Results from last 7 days   Lab Units 02/03/22  0413   MAGNESIUM mg/dL 2.0                   Meds:   SCHEDULE  dexamethasone, 6 mg, Oral, Daily With Breakfast  enoxaparin, 60 mg, Subcutaneous, Q12H  First Mouthwash (Magic  Mouthwash), 10 mL, Swish & Spit, Q6H  midodrine, 10 mg, Oral, TID AC  mirtazapine, 15 mg, Oral, Nightly  pantoprazole, 40 mg, Oral, QAM  senna-docusate sodium, 2 tablet, Oral, BID  sodium chloride, 10 mL, Intravenous, Q12H      Infusions     PRNs  •  acetaminophen **OR** acetaminophen **OR** acetaminophen  •  albuterol sulfate HFA  •  aluminum-magnesium hydroxide-simethicone  •  benzonatate  •  senna-docusate sodium **AND** polyethylene glycol **AND** bisacodyl **AND** bisacodyl  •  Lidocaine Viscous HCl  •  magnesium sulfate **OR** magnesium sulfate in D5W 1g/100mL (PREMIX)  •  meclizine  •  melatonin  •  nitroglycerin  •  ondansetron **OR** ondansetron  •  potassium chloride  •  potassium chloride  •  potassium phosphate infusion greater than 15 mMoles **OR** potassium phosphate infusion greater than 15 mMoles **OR** potassium phosphate **OR** sodium phosphate IVPB **OR** sodium phosphate IVPB **OR** sodium phosphate IVPB  •  sodium chloride  •  sodium chloride  •  sodium chloride    Physical Exam:  Physical Exam  Vitals reviewed.   Pulmonary:      Breath sounds: Rhonchi present.   Skin:     General: Skin is warm and dry.   Neurological:      Mental Status: She is alert.         ROS  Review of Systems   Respiratory: Positive for shortness of breath.      I have reviewed the patient's new clinical results    Electronically signed by KARIN Peres

## 2022-02-03 NOTE — PLAN OF CARE
Goal Outcome Evaluation:           Progress: improving  Outcome Summary: Patient started on 7L at beginning of shift.  Patient is now on 5L NC.  Patient did 6 minuite walk with RT.  RT stated she did not pass.  Dropped to 83% and now sitting in recliner.

## 2022-02-03 NOTE — CASE MANAGEMENT/SOCIAL WORK
Continued Stay Note  KIRILL Amador     Patient Name: Nila Jin  MRN: [id number]  Today's Date: 2/3/2022    Admit Date: 1/17/2022     Discharge Plan     Row Name 02/03/22 1152       Plan    Plan Comments CM discussed with RN that DC orders are still in from yesterday, however patient remains on 7L O2. CM updated Carolina Willams that patient may still be discharged today if able to tolerate being titrated down on O2. Liaison reported that she will watch for patient’s discharge status to provide the O2 and wheelchair.              Expected Discharge Date and Time     Expected Discharge Date Expected Discharge Time    Feb 3, 2022      Phone communication or documentation only - no physical contact with patient or family.    Berna Romano RN     Office Phone: [phone]  Office Cell: [phone]

## 2022-02-03 NOTE — PROGRESS NOTES
[facility] Medicine Services Daily Progress Note    Patient Name: Nila Jin  : 1945  MRN: [id number]  Primary Care Physician:  Baudilio Roldan IV, MD  Date of admission: 2022      Subjective    From previous note and with minor changes.  Chief Complaint: COVID, diarrhea    Patient reports a significant improvement in her symptoms.  Patient is currently on 5L via nasal cannula.  Patient Reports denies any complaints.   Patient was initially  moved to PCU for BiPAP several days ago.    ROS negative except as above      Objective      Vitals:   Temp:  [98 °F (36.7 °C)-99.6 °F (37.6 °C)] 99.6 °F (37.6 °C)  Heart Rate:  [72-97] 80  Resp:  [16-18] 16  BP: ()/(41-88) 108/69  Flow (L/min):  [7] 7       Vitals reviewed.  Stable.  HENT:      Head: Normocephalic.      Nose: Nose normal.      Mouth/Throat:      Mouth: Mucous membranes are moist.   Eyes:      Pupils: Pupils are equal, round, and reactive to light.   Cardiovascular:      Rate and Rhythm: Normal rate and regular rhythm.      Pulses: Normal pulses.      Heart sounds: Normal heart sounds.   Pulmonary:      Effort: Pulmonary effort is normal.      Breath sounds: mild rhonchi present.   Abdominal:      General: Abdomen is flat.      Palpations: Abdomen is soft.   Musculoskeletal:         General: Swelling present. Normal range of motion.      Cervical back: Normal range of motion.   Skin:     General: Skin is warm.      Capillary Refill: Capillary refill takes less than 2 seconds.   Neurological:      General: No focal deficit present.      Mental Status: She is alert and oriented to person, place, and time.   Psychiatric:         Mood and Affect: Flat affect.         Behavior: Behavior normal.     Result Review    Result Review:  I have personally reviewed the results from the time of this admission to 2/3/2022 15:01 EST and agree with these findings:  [x]  Laboratory  [x]  Microbiology  [x]  Radiology  []  EKG/Telemetry    []  Cardiology/Vascular   []  Pathology  []  Old records  []  Other:  Most notable findings include:      Procedure Component Value Units Date/Time   XR Chest 1 View [[id number]] Alireza as Reviewed   Order Status: Completed Collected: 01/31/22 0852    Updated: 01/31/22 0855   Narrative:     DATE OF EXAM:   1/31/2022 8:43 AM       PROCEDURE:   XR CHEST 1 VW-       INDICATIONS:   COVID-19 pneumonia; U07.1-COVID-19; J12.82-Pneumonia due to coronavirus   disease 2019; E86.0-Dehydration; R42-Dizziness and giddiness;   N39.0-Urinary tract infection, site not specified; R09.02-Hypoxemia       COMPARISON:   01/27/2022       TECHNIQUE:   Single radiographic AP view of the chest was obtained.       FINDINGS:   Cardiac size remains normal. There is patchy airspace disease in the   left lung base. The vascular markings in the chest appear normal. No   pleural fluid is seen.       Impression:     Patchy airspace disease in the left lung base consistent with pneumonia.       Electronically Signed By-Reddy Marquez MD On:1/31/2022 8:53 AM   This report was finalized on [id number] by  Reddy Marquez MD.               Assessment/Plan      Brief Patient Summary:  Patient is a 76 y.o. female with a history of generalized anxiety disorder, hyperlipidemia, tobacco use disorder, IBS, myeloproliferative disorder, primary insomnia, primary osteoarthritis and chronic nonseasonal allergic rhinitis due to pollen and GERD who presented to the ER at [facility] on 1/17/2022 complaining of dizziness. The patient states she felt like she might pass out. She denies any chest pain. She reports some mild shortness of breath and a nonproductive cough. She states she initially started feeling bad 2-3 weeks ago. She reports fatigue, nausea, and some loose stools. She denies any exacerbating or alleviating factors. She has not been vaccinated for Covid. She does not wear home oxygen. She states she smokes 1 ppd, but hasn't smoked for the past week  because she felt so bad.   Patient was seen in the ER and work-up revealed Covid pneumonia, UTI, and dehydration. The patient was given IV fluids and ceftriaxone. She was also given Zofran and meclizine. Her oxygen saturation was initially in the mid 90s on room air, but then she started dropping down so she was placed on supplemental oxygen with improvement. She was admitted to the Hospitalist group for further treatment.        dexamethasone, 6 mg, Oral, Daily With Breakfast  enoxaparin, 60 mg, Subcutaneous, Q12H  First Mouthwash (Magic Mouthwash), 10 mL, Swish & Spit, Q6H  midodrine, 10 mg, Oral, TID AC  mirtazapine, 15 mg, Oral, Nightly  pantoprazole, 40 mg, Oral, QAM  senna-docusate sodium, 2 tablet, Oral, BID  sodium chloride, 10 mL, Intravenous, Q12H             Active Hospital Problems:  Active Hospital Problems    Diagnosis    • **Acute hypoxemic respiratory failure due to COVID-19 (HCC)    • Pneumonia due to COVID-19 virus    • Acute UTI (urinary tract infection)    • Cytokine release syndrome, grade 1    • Mixed hyperlipidemia    • Adjustment disorder with depressed mood    • Dizziness    • Dehydration    • IBS (irritable bowel syndrome)    • GERD without esophagitis    • Tobacco use disorder    • Myeloproliferative disorder (HCC)    • Primary osteoarthritis involving multiple joints        Plan:   Pneumonia due to COVID-19 virus  Acute hypoxemic respiratory failure due to COVID-19   -baseline:  Room air  -Patient is currently on nasal cannula 5 L.  -titrate O2 to keep sat >92%  -Decadron x 10 days  -Remdesivir not indicated as patient's symptoms started 2-3 weeks ago  -Lovenox per VTE prophylaxis  -encourage IS  -enhanced droplet/contact isolation   -PRN albuterol and Tessalon Perles   - Check echocardiogram -no abnormalities  -Patient is now persistently febrile we will consult ID for assistance  -Hypoxemia despite high flow.  BiPAP ordered.    And moved to PCU on January 23.  She does seem clinically  comfortable.  Pulmonary consult requested appreciate assistance     Dizziness  -likely secondary to dehydration  -meclizine TID PRN, dose increased on January 21 for recurrent hypotension orthostatic in nature  Echo normal  Orthostatics positive  Midodrine started and doubled to 10 3 times daily  Continue  cc an hour     Acute UTI (urinary tract infection)  -urine culture sent  -Treated with ceftriaxone     Dehydration  -secondary to poor oral intake  -IVF     Acid reflux  -continue home medication once list verified      Tobacco abuse  -encourage cessation  -patient refused nicotine patch      Depressive symptoms  Psychiatry consulted  Appreciate their input     Fever tachycardia worsening hypoxemia  The patient was started on IV antibiotics      DVT prophylaxis:  Medical DVT prophylaxis orders are present.     CODE STATUS:    Code Status (Patient has no pulse and is not breathing): CPR (Attempt to Resuscitate)  Medical Interventions (Patient has pulse or is breathing): Full Support     Disposition: This wonderful patient may discharge in the next 24 to 48 hours depending on oxygen requirement.     I discussed the patient's findings and my recommendations with patient and family at bedside.     This patient has been examined wearing appropriate Personal Protective Equipment.   02/03/22      Electronically signed by Trevon Watson MD, FACP,  02/03/22, 15:01 EST.      [facility]

## 2022-02-04 LAB
ALBUMIN SERPL-MCNC: 2.8 G/DL (ref 3.5–5.2)
ALBUMIN/GLOB SERPL: 0.8 G/DL
ALP SERPL-CCNC: 124 U/L (ref 39–117)
ALT SERPL W P-5'-P-CCNC: 60 U/L (ref 1–33)
ANION GAP SERPL CALCULATED.3IONS-SCNC: 9 MMOL/L (ref 5–15)
AST SERPL-CCNC: 27 U/L (ref 1–32)
BASOPHILS # BLD AUTO: 0.1 10*3/MM3 (ref 0–0.2)
BASOPHILS NFR BLD AUTO: 0.5 % (ref 0–1.5)
BILIRUB SERPL-MCNC: 0.3 MG/DL (ref 0–1.2)
BUN SERPL-MCNC: 14 MG/DL (ref 8–23)
BUN/CREAT SERPL: 28 (ref 7–25)
CALCIUM SPEC-SCNC: 8.9 MG/DL (ref 8.6–10.5)
CHLORIDE SERPL-SCNC: 108 MMOL/L (ref 98–107)
CO2 SERPL-SCNC: 27 MMOL/L (ref 22–29)
CREAT SERPL-MCNC: 0.5 MG/DL (ref 0.57–1)
D DIMER PPP FEU-MCNC: 0.42 MG/L (FEU) (ref 0–0.59)
DEPRECATED RDW RBC AUTO: 50.8 FL (ref 37–54)
EOSINOPHIL # BLD AUTO: 0 10*3/MM3 (ref 0–0.4)
EOSINOPHIL NFR BLD AUTO: 0 % (ref 0.3–6.2)
ERYTHROCYTE [DISTWIDTH] IN BLOOD BY AUTOMATED COUNT: 16.9 % (ref 12.3–15.4)
ERYTHROCYTE [SEDIMENTATION RATE] IN BLOOD: 52 MM/HR (ref 0–30)
GFR SERPL CREATININE-BSD FRML MDRD: 120 ML/MIN/1.73
GLOBULIN UR ELPH-MCNC: 3.3 GM/DL
GLUCOSE SERPL-MCNC: 88 MG/DL (ref 65–99)
HCT VFR BLD AUTO: 34.1 % (ref 34–46.6)
HGB BLD-MCNC: 11.4 G/DL (ref 12–15.9)
LYMPHOCYTES # BLD AUTO: 2.5 10*3/MM3 (ref 0.7–3.1)
LYMPHOCYTES NFR BLD AUTO: 22.8 % (ref 19.6–45.3)
MAGNESIUM SERPL-MCNC: 2.5 MG/DL (ref 1.6–2.4)
MCH RBC QN AUTO: 28.8 PG (ref 26.6–33)
MCHC RBC AUTO-ENTMCNC: 33.5 G/DL (ref 31.5–35.7)
MCV RBC AUTO: 85.7 FL (ref 79–97)
MONOCYTES # BLD AUTO: 1.2 10*3/MM3 (ref 0.1–0.9)
MONOCYTES NFR BLD AUTO: 11.2 % (ref 5–12)
NEUTROPHILS NFR BLD AUTO: 65.5 % (ref 42.7–76)
NEUTROPHILS NFR BLD AUTO: 7.3 10*3/MM3 (ref 1.7–7)
NRBC BLD AUTO-RTO: 0 /100 WBC (ref 0–0.2)
PLATELET # BLD AUTO: 557 10*3/MM3 (ref 140–450)
PMV BLD AUTO: 8.2 FL (ref 6–12)
POTASSIUM SERPL-SCNC: 4.4 MMOL/L (ref 3.5–5.2)
PROT SERPL-MCNC: 6.1 G/DL (ref 6–8.5)
RBC # BLD AUTO: 3.97 10*6/MM3 (ref 3.77–5.28)
SODIUM SERPL-SCNC: 144 MMOL/L (ref 136–145)
WBC NRBC COR # BLD: 11.1 10*3/MM3 (ref 3.4–10.8)

## 2022-02-04 PROCEDURE — 36415 COLL VENOUS BLD VENIPUNCTURE: CPT

## 2022-02-04 PROCEDURE — 85025 COMPLETE CBC W/AUTO DIFF WBC: CPT

## 2022-02-04 PROCEDURE — 97116 GAIT TRAINING THERAPY: CPT

## 2022-02-04 PROCEDURE — 83735 ASSAY OF MAGNESIUM: CPT | Performed by: INTERNAL MEDICINE

## 2022-02-04 PROCEDURE — 99233 SBSQ HOSP IP/OBS HIGH 50: CPT | Performed by: INTERNAL MEDICINE

## 2022-02-04 PROCEDURE — 85652 RBC SED RATE AUTOMATED: CPT

## 2022-02-04 PROCEDURE — 97530 THERAPEUTIC ACTIVITIES: CPT

## 2022-02-04 PROCEDURE — 85379 FIBRIN DEGRADATION QUANT: CPT | Performed by: INTERNAL MEDICINE

## 2022-02-04 PROCEDURE — 80053 COMPREHEN METABOLIC PANEL: CPT

## 2022-02-04 PROCEDURE — 25010000002 ENOXAPARIN PER 10 MG: Performed by: INTERNAL MEDICINE

## 2022-02-04 RX ORDER — MECLIZINE HYDROCHLORIDE 25 MG/1
25 TABLET ORAL 2 TIMES DAILY
Status: DISCONTINUED | OUTPATIENT
Start: 2022-02-04 | End: 2022-02-07 | Stop reason: HOSPADM

## 2022-02-04 RX ADMIN — DEXAMETHASONE 6 MG: 6 TABLET ORAL at 10:02

## 2022-02-04 RX ADMIN — DIPHENHYDRAMINE HYDROCHLORIDE AND LIDOCAINE HYDROCHLORIDE AND ALUMINUM HYDROXIDE AND MAGNESIUM HYDRO 10 ML: KIT at 23:27

## 2022-02-04 RX ADMIN — MIDODRINE HYDROCHLORIDE 10 MG: 5 TABLET ORAL at 16:43

## 2022-02-04 RX ADMIN — ENOXAPARIN SODIUM 60 MG: 60 INJECTION SUBCUTANEOUS at 13:06

## 2022-02-04 RX ADMIN — SODIUM CHLORIDE, PRESERVATIVE FREE 10 ML: 5 INJECTION INTRAVENOUS at 20:40

## 2022-02-04 RX ADMIN — MECLIZINE HYDROCHLORIDE 25 MG: 25 TABLET ORAL at 16:43

## 2022-02-04 RX ADMIN — Medication 5 MG: at 20:40

## 2022-02-04 RX ADMIN — MIDODRINE HYDROCHLORIDE 10 MG: 5 TABLET ORAL at 10:03

## 2022-02-04 RX ADMIN — DOCUSATE SODIUM 50 MG AND SENNOSIDES 8.6 MG 2 TABLET: 8.6; 5 TABLET, FILM COATED ORAL at 10:02

## 2022-02-04 RX ADMIN — MIRTAZAPINE 15 MG: 15 TABLET, FILM COATED ORAL at 20:40

## 2022-02-04 RX ADMIN — DIPHENHYDRAMINE HYDROCHLORIDE AND LIDOCAINE HYDROCHLORIDE AND ALUMINUM HYDROXIDE AND MAGNESIUM HYDRO 10 ML: KIT at 16:43

## 2022-02-04 RX ADMIN — PANTOPRAZOLE SODIUM 40 MG: 40 TABLET, DELAYED RELEASE ORAL at 10:03

## 2022-02-04 RX ADMIN — MIDODRINE HYDROCHLORIDE 10 MG: 5 TABLET ORAL at 13:06

## 2022-02-04 RX ADMIN — SODIUM CHLORIDE, PRESERVATIVE FREE 10 ML: 5 INJECTION INTRAVENOUS at 10:02

## 2022-02-04 RX ADMIN — DIPHENHYDRAMINE HYDROCHLORIDE AND LIDOCAINE HYDROCHLORIDE AND ALUMINUM HYDROXIDE AND MAGNESIUM HYDRO 10 ML: KIT at 10:03

## 2022-02-04 RX ADMIN — ENOXAPARIN SODIUM 60 MG: 60 INJECTION SUBCUTANEOUS at 01:09

## 2022-02-04 RX ADMIN — MECLIZINE HYDROCHLORIDE 25 MG: 25 TABLET ORAL at 20:40

## 2022-02-04 RX ADMIN — DIPHENHYDRAMINE HYDROCHLORIDE AND LIDOCAINE HYDROCHLORIDE AND ALUMINUM HYDROXIDE AND MAGNESIUM HYDRO 10 ML: KIT at 05:28

## 2022-02-04 NOTE — PLAN OF CARE
Goal Outcome Evaluation:        Bed mobility - Modified-Independent supine to sit  Transfers - CGA and with rolling walker sit to stand from eob to bs comode before gait training  Ambulation - 35 feet CGA and with rolling walker    Pt would benefit from Home with family assist and and Home Health at discharge from facility and requires rolling walker at discharge.   Pt desires Home with family assist and and Home Health at discharge. Pt cooperative; agreeable to therapeutic recommendations and plan of care.

## 2022-02-04 NOTE — CASE MANAGEMENT/SOCIAL WORK
Continued Stay Note  KIRILL Amador     Patient Name: Nila Jin  MRN: [id number]  Today's Date: 2/4/2022    Admit Date: 1/17/2022     Discharge Plan     Row Name 02/04/22 1421       Plan    Plan Comments CM contacted MD to inquire if patient would be discharging and if so, a new home O2 order would be needed to reflect her need for 6L O2.  LOLLY discussed with Donis Figueroa that a different concentrator would be needed if patient discharged with 6L rather than 5L. Discussed with Carolina that O2 is continuing to be monitoring today.              Phone communication or documentation only - no physical contact with patient or family.    Berna Romano RN     Office Phone: [phone]  Office Cell: [phone]

## 2022-02-04 NOTE — THERAPY TREATMENT NOTE
Subjective: Pt agreeable to therapeutic plan of care.    Objective:     Bed mobility - Modified-Independent supine to sit  Transfers - CGA and with rolling walker sit to stand from eob to bs comode before gait training  Ambulation - 35 feet CGA and with rolling walker     Pt on 6L HF nc sats were 95% at rest.  Pt's sats dropped to 87% during gait training.  Pt recovered back to 90% after 1 minute and 93% at 2 minutes.  Pt not short of air.      Pain: 0 VAS  Education: Provided education on importance of mobility and skilled verbal / tactile cueing throughout intervention.     Assessment: Nila Jin presents with functional mobility impairments which indicate the need for skilled intervention. Pt seemed to tolerate increased activity today on 6L HF nc. Tolerating session today without incident. Will continue to follow and progress as tolerated.     Plan/Recommendations:   Pt would benefit from Home with family assist and and Home Health at discharge from facility and requires rolling walker at discharge.   Pt desires Home with family assist and and Home Health at discharge. Pt cooperative; agreeable to therapeutic recommendations and plan of care.     Post-Tx Position: Up in Chair, Alarms activated and Call light and personal items within reach  PPE: gloves, surgical mask, eyewear protection

## 2022-02-04 NOTE — PROGRESS NOTES
Daily Progress Note        Acute hypoxemic respiratory failure due to COVID-19 (HCC)    Pneumonia due to COVID-19 virus    Dizziness    Acute UTI (urinary tract infection)    Dehydration    GERD without esophagitis    Tobacco use disorder    IBS (irritable bowel syndrome)    Myeloproliferative disorder (HCC)    Primary osteoarthritis involving multiple joints    Adjustment disorder with depressed mood    Mixed hyperlipidemia    Cytokine release syndrome, grade 1      Assessment    Pneumonia due to COVID-19 virus  Acute hypoxemic respiratory failure: ABGs 1/23/2022, pH 7.49, PCO2 33.1, PO2 53.4   sepsis: Fever with increasing C-reactive protein and worsening bilateral pulmonary infiltrates on 1/22/2022: New bacterial pneumonia due to nonspecific pathogen  Increasing D-dimer  UTI  Hypotension and dehydration  Tobacco smoking     Echo 1/21/2022  -EF 61-65%     Blood cultures negative at 24 hours  MRSA negative     Venous lower extremity Doppler 1/24/2022  -Negative     Plan     Continue to titrate oxygen, BiPAP as needed  -Currently on  6L high flow , decreased from 45/64% airvo    Decadron 6 mg daily  Midodrine  Inhaled corticosteroids  Bronchodilators  Electrolyte/Glycemic control  DVT/GI Prophylaxis-Lovenox and Protonix    Completed course of Rocephin for urinary tract infection    Patient out of the window for remdesivir    Will need 6-minute walk prior to discharge     1/17/2022        COVID-19 LAB PANEL     COVID-19 test result  COVID19   Date Value Ref Range Status   01/17/2022 Detected (C) Not Detected - Ref. Range Final       COVID-19 Prognostic lab results  WBC with differential  Results from last 7 days   Lab Units 02/04/22  0408 02/03/22  0413 02/02/22  0452 02/01/22  0314   WBC 10*3/mm3 11.10* 9.00 9.70 9.30   PLATELETS 10*3/mm3 557* 567* 584* 614*   NEUTROPHIL % % 65.5 61.0 70.4 68.4   LYMPHOCYTE % % 22.8 24.7 15.1* 16.6*   NEUTROS ABS 10*3/mm3 7.30* 5.50 6.80 6.30   LYMPHS ABS 10*3/mm3 2.50 2.20 1.50  1.50     Inflammatory markers  Results from last 7 days   Lab Units 02/04/22  0408 02/03/22  0413 02/02/22  0452 02/01/22  0314 01/31/22  0335 01/29/22  0446   D DIMER QUANT mg/L (FEU) 0.42  --  0.57  --  0.62* 0.61*   ALK PHOS U/L 124* 123* 117 135*  --   --    AST (SGOT) U/L 27 24 21 25  --   --    ALT (SGPT) U/L 60* 46* 52* 71*  --   --        Poor COVID-19 outcomes associated with:  Neutrophil:Lymphocyte ratio >3.5  Thrombocytopenia, lymphopenia  LFT's >5x upper limit of normal  LDH >400     LOS: 18 days     Subjective         Objective     Vital signs for last 24 hours:  Vitals:    02/04/22 0111 02/04/22 0115 02/04/22 0530 02/04/22 0550   BP:   (!) 88/59 111/50   BP Location:   Right arm    Patient Position:   Lying    Pulse:   82 79   Resp:   20    Temp:   97.4 °F (36.3 °C)    TempSrc:   Oral    SpO2: (!) 87% (!) 88% 92% 94%   Weight:   59.7 kg (131 lb 9.8 oz)    Height:           Intake/Output last 3 shifts:  I/O last 3 completed shifts:  In: 1080 [P.O.:1080]  Out: 2070 [Urine:2070]  Intake/Output this shift:  No intake/output data recorded.      Radiology  Imaging Results (Last 24 Hours)     ** No results found for the last 24 hours. **          Labs:  Results from last 7 days   Lab Units 02/04/22 0408   WBC 10*3/mm3 11.10*   HEMOGLOBIN g/dL 11.4*   HEMATOCRIT % 34.1   PLATELETS 10*3/mm3 557*     Results from last 7 days   Lab Units 02/04/22 0408   SODIUM mmol/L 144   POTASSIUM mmol/L 4.4   CHLORIDE mmol/L 108*   CO2 mmol/L 27.0   BUN mg/dL 14   CREATININE mg/dL 0.50*   CALCIUM mg/dL 8.9   BILIRUBIN mg/dL 0.3   ALK PHOS U/L 124*   ALT (SGPT) U/L 60*   AST (SGOT) U/L 27   GLUCOSE mg/dL 88         Results from last 7 days   Lab Units 02/04/22  0408 02/03/22  0413 02/02/22  0452   ALBUMIN g/dL 2.80* 2.60* 2.60*             Results from last 7 days   Lab Units 02/04/22  0408   MAGNESIUM mg/dL 2.5*                   Meds:   SCHEDULE  dexamethasone, 6 mg, Oral, Daily With Breakfast  enoxaparin, 60 mg,  Subcutaneous, Q12H  First Mouthwash (Magic Mouthwash), 10 mL, Swish & Spit, Q6H  midodrine, 10 mg, Oral, TID AC  mirtazapine, 15 mg, Oral, Nightly  pantoprazole, 40 mg, Oral, QAM  senna-docusate sodium, 2 tablet, Oral, BID  sodium chloride, 10 mL, Intravenous, Q12H      Infusions     PRNs  •  acetaminophen **OR** acetaminophen **OR** acetaminophen  •  albuterol sulfate HFA  •  aluminum-magnesium hydroxide-simethicone  •  benzonatate  •  senna-docusate sodium **AND** polyethylene glycol **AND** bisacodyl **AND** bisacodyl  •  Lidocaine Viscous HCl  •  magnesium sulfate **OR** magnesium sulfate in D5W 1g/100mL (PREMIX)  •  meclizine  •  melatonin  •  nitroglycerin  •  ondansetron **OR** ondansetron  •  potassium chloride  •  potassium chloride  •  potassium phosphate infusion greater than 15 mMoles **OR** potassium phosphate infusion greater than 15 mMoles **OR** potassium phosphate **OR** sodium phosphate IVPB **OR** sodium phosphate IVPB **OR** sodium phosphate IVPB  •  sodium chloride  •  sodium chloride  •  sodium chloride    Physical Exam:  Physical Exam  Vitals reviewed.   Pulmonary:      Breath sounds: Rhonchi present.   Skin:     General: Skin is warm and dry.   Neurological:      Mental Status: She is alert.         ROS  Review of Systems   Respiratory: Positive for shortness of breath.      I have reviewed the patient's new clinical results    Electronically signed by KARIN Peres

## 2022-02-04 NOTE — PROGRESS NOTES
Nutrition Services    Patient Name: Nila Jin  YOB: 1945  MRN: [id number]  Admission date: 1/17/2022    PPE Documentation        PPE Worn By Provider Did not enter room for this encounter   PPE Worn By Patient  N/A     PROGRESS NOTE      Encounter Information: Check on for PO intakes.  Noted with plans to D/C.  Was on 5L NC yesterday.         PO Diet: Diet Regular   PO Supplements:    PO Intake:  50% average po intakes x 3 days       Nutrition support orders:    Nutrition support review:        Labs (reviewed below): Reviewed        GI Function:  Last BM 2/2       Nutrition Intervention: Continue current diet, home supplements and encourage good po intakes.    May need to consider enteral nutrition if patient continues with poor po intakes.     Results from last 7 days   Lab Units 02/04/22 0408 02/03/22 0413 02/02/22 0452   SODIUM mmol/L 144 139 139   POTASSIUM mmol/L 4.4 4.3 4.5   CHLORIDE mmol/L 108* 105 105   CO2 mmol/L 27.0 26.0 25.0   BUN mg/dL 14 13 13   CREATININE mg/dL 0.50* 0.51* 0.55*   CALCIUM mg/dL 8.9 8.8 8.5*   BILIRUBIN mg/dL 0.3 0.4 0.4   ALK PHOS U/L 124* 123* 117   ALT (SGPT) U/L 60* 46* 52*   AST (SGOT) U/L 27 24 21   GLUCOSE mg/dL 88 114* 98     Results from last 7 days   Lab Units 02/04/22 0408 02/03/22 0413 02/03/22 0413 02/02/22 0452 02/02/22 0452   MAGNESIUM mg/dL 2.5*  --  2.0  --  1.9   HEMOGLOBIN g/dL 11.4*   < > 11.5*   < > 11.3*   HEMATOCRIT % 34.1   < > 34.5   < > 33.8*    < > = values in this interval not displayed.     COVID19   Date Value Ref Range Status   01/17/2022 Detected (C) Not Detected - Ref. Range Final     No results found for: HGBA1C    RD to follow up per protocol.    Electronically signed by:  Sophie Kaplan RD  02/04/22 09:42 EST

## 2022-02-04 NOTE — PLAN OF CARE
Goal Outcome Evaluation:  Plan of Care Reviewed With: patient        Progress: no change  Outcome Summary: turned oxygen up to 6L NC, continue to monitor

## 2022-02-04 NOTE — PROGRESS NOTES
[facility] Daily Progress Note    Patient Name: Nila Jin  : 1945  MRN: [id number]  Primary Care Physician:  Baudilio Roldan IV, MD  Date of admission: 2022      Subjective    From previous note and with minor changes.  Chief Complaint: COVID, diarrhea    Patient reports a significant improvement in her symptoms.  Patient is currently on 5L via nasal cannula.  Patient Reports denies any complaints.   Patient was initially  moved to PCU for BiPAP several days ago.    ROS negative except as above      Objective      Vitals:   Temp:  [96.7 °F (35.9 °C)-97.7 °F (36.5 °C)] 97.3 °F (36.3 °C)  Heart Rate:  [67-82] 72  Resp:  [18-20] 18  BP: ()/(47-78) 112/59  Flow (L/min):  [5-7] 6       Vitals reviewed.  Stable.  HENT:      Head: Normocephalic.      Nose: Nose normal.      Mouth/Throat:      Mouth: Mucous membranes are moist.   Eyes:      Pupils: Pupils are equal, round, and reactive to light.   Cardiovascular:      Rate and Rhythm: Normal rate and regular rhythm.      Pulses: Normal pulses.      Heart sounds: Normal heart sounds.   Pulmonary:      Effort: Pulmonary effort is normal.      Breath sounds: mild rhonchi present.   Abdominal:      General: Abdomen is flat.      Palpations: Abdomen is soft.   Musculoskeletal:         General: Swelling present. Normal range of motion.      Cervical back: Normal range of motion.   Skin:     General: Skin is warm.      Capillary Refill: Capillary refill takes less than 2 seconds.   Neurological:      General: No focal deficit present.      Mental Status: She is alert and oriented to person, place, and time.   Psychiatric:         Mood and Affect: Flat affect.         Behavior: Behavior normal.     Result Review    Result Review:  I have personally reviewed the results from the time of this admission to 2022 14:29 EST and agree with these findings:  [x]  Laboratory  [x]  Microbiology  [x]  Radiology  []   EKG/Telemetry   []  Cardiology/Vascular   []  Pathology  []  Old records  []  Other:  Most notable findings include:      Procedure Component Value Units Date/Time   XR Chest 1 View [[id number]] Alireza as Reviewed   Order Status: Completed Collected: 01/31/22 0852    Updated: 01/31/22 0855   Narrative:     DATE OF EXAM:   1/31/2022 8:43 AM       PROCEDURE:   XR CHEST 1 VW-       INDICATIONS:   COVID-19 pneumonia; U07.1-COVID-19; J12.82-Pneumonia due to coronavirus   disease 2019; E86.0-Dehydration; R42-Dizziness and giddiness;   N39.0-Urinary tract infection, site not specified; R09.02-Hypoxemia       COMPARISON:   01/27/2022       TECHNIQUE:   Single radiographic AP view of the chest was obtained.       FINDINGS:   Cardiac size remains normal. There is patchy airspace disease in the   left lung base. The vascular markings in the chest appear normal. No   pleural fluid is seen.       Impression:     Patchy airspace disease in the left lung base consistent with pneumonia.       Electronically Signed By-Reddy Marquez MD On:1/31/2022 8:53 AM   This report was finalized on [id number] by  Reddy Marquez MD.               Assessment/Plan      Brief Patient Summary:  Patient is a 76 y.o. female with a history of IBS, generalized anxiety disorder, hyperlipidemia, tobacco use disorder, myeloproliferative disorder, primary insomnia, primary osteoarthritis and chronic nonseasonal allergic rhinitis due to pollen and GERD who presented to the ER at [facility] on 1/17/2022 complaining of dizziness. The patient states she felt like she might pass out. She denies any chest pain. She reports some mild shortness of breath and a nonproductive cough. She states she initially started feeling bad 2-3 weeks ago. She reports fatigue, nausea, and some loose stools. She denies any exacerbating or alleviating factors. She has not been vaccinated for Covid. She does not wear home oxygen. She states she smokes 1 ppd, but hasn't smoked for  the past week because she felt so bad.   Patient was seen in the ER and work-up revealed Covid pneumonia, UTI, and dehydration. The patient was given IV fluids and ceftriaxone. She was also given Zofran and meclizine. Her oxygen saturation was initially in the mid 90s on room air, but then she started dropping down so she was placed on supplemental oxygen with improvement. She was admitted to the Hospitalist group for further treatment.        dexamethasone, 6 mg, Oral, Daily With Breakfast  enoxaparin, 60 mg, Subcutaneous, Q12H  First Mouthwash (Magic Mouthwash), 10 mL, Swish & Spit, Q6H  midodrine, 10 mg, Oral, TID AC  mirtazapine, 15 mg, Oral, Nightly  pantoprazole, 40 mg, Oral, QAM  senna-docusate sodium, 2 tablet, Oral, BID  sodium chloride, 10 mL, Intravenous, Q12H             Active Hospital Problems:  Active Hospital Problems    Diagnosis    • **Acute hypoxemic respiratory failure due to COVID-19 (HCC)    • Pneumonia due to COVID-19 virus    • Acute UTI (urinary tract infection)    • Cytokine release syndrome, grade 1    • Mixed hyperlipidemia    • Adjustment disorder with depressed mood    • Dizziness    • Dehydration    • IBS (irritable bowel syndrome)    • GERD without esophagitis    • Tobacco use disorder    • Myeloproliferative disorder (HCC)    • Primary osteoarthritis involving multiple joints        Plan:   Pneumonia due to COVID-19 virus  Acute hypoxemic respiratory failure due to COVID-19   -baseline:  Room air  -Patient is currently on nasal cannula 5 L.  -titrate O2 to keep sat >92%  -Decadron x 10 days  -Remdesivir not indicated as patient's symptoms started 2-3 weeks ago  -Lovenox per VTE prophylaxis  -encourage IS  -enhanced droplet/contact isolation   -PRN albuterol and Tessalon Perles   - Check echocardiogram -no abnormalities  -Patient is now persistently febrile we will consult ID for assistance  -Hypoxemia despite high flow.  BiPAP ordered.    And moved to PCU on January 23.  She does  seem clinically comfortable.  Pulmonary consult requested appreciate assistance     Dizziness  -likely secondary to dehydration  -meclizine TID PRN, dose increased on January 21 for recurrent hypotension orthostatic in nature  Echo normal  Orthostatics positive  Midodrine started and doubled to 10 3 times daily  Continue  cc an hour     Acute UTI (urinary tract infection)  -urine culture sent  -Treated with ceftriaxone     Dehydration  -secondary to poor oral intake  -IVF     Acid reflux  -continue home medication once list verified      Tobacco abuse  -encourage cessation  -patient refused nicotine patch      Depressive symptoms  Psychiatry consulted  Appreciate their input     Fever tachycardia worsening hypoxemia  The patient was started on IV antibiotics      DVT prophylaxis:  Medical DVT prophylaxis orders are present.     CODE STATUS:    Code Status (Patient has no pulse and is not breathing): CPR (Attempt to Resuscitate)  Medical Interventions (Patient has pulse or is breathing): Full Support     Disposition: This wonderful patient may discharge in the next 24 hours depending on oxygen requirement.     I discussed the patient's findings and my recommendations with patient and family at bedside.     This patient has been examined wearing appropriate Personal Protective Equipment.   02/04/22      Electronically signed by Trevon Watson MD, FACP,  02/04/22, 14:29 EST.      [facility]

## 2022-02-05 LAB
ALBUMIN SERPL-MCNC: 3 G/DL (ref 3.5–5.2)
ALBUMIN/GLOB SERPL: 0.9 G/DL
ALP SERPL-CCNC: 116 U/L (ref 39–117)
ALT SERPL W P-5'-P-CCNC: 54 U/L (ref 1–33)
ANION GAP SERPL CALCULATED.3IONS-SCNC: 8 MMOL/L (ref 5–15)
AST SERPL-CCNC: 22 U/L (ref 1–32)
BASOPHILS # BLD AUTO: 0 10*3/MM3 (ref 0–0.2)
BASOPHILS NFR BLD AUTO: 0.4 % (ref 0–1.5)
BILIRUB SERPL-MCNC: 0.4 MG/DL (ref 0–1.2)
BUN SERPL-MCNC: 14 MG/DL (ref 8–23)
BUN/CREAT SERPL: 27.5 (ref 7–25)
CALCIUM SPEC-SCNC: 8.9 MG/DL (ref 8.6–10.5)
CHLORIDE SERPL-SCNC: 104 MMOL/L (ref 98–107)
CO2 SERPL-SCNC: 29 MMOL/L (ref 22–29)
CREAT SERPL-MCNC: 0.51 MG/DL (ref 0.57–1)
DEPRECATED RDW RBC AUTO: 50.8 FL (ref 37–54)
EOSINOPHIL # BLD AUTO: 0 10*3/MM3 (ref 0–0.4)
EOSINOPHIL NFR BLD AUTO: 0.3 % (ref 0.3–6.2)
ERYTHROCYTE [DISTWIDTH] IN BLOOD BY AUTOMATED COUNT: 17.1 % (ref 12.3–15.4)
ERYTHROCYTE [SEDIMENTATION RATE] IN BLOOD: 53 MM/HR (ref 0–30)
GFR SERPL CREATININE-BSD FRML MDRD: 117 ML/MIN/1.73
GLOBULIN UR ELPH-MCNC: 3.3 GM/DL
GLUCOSE SERPL-MCNC: 81 MG/DL (ref 65–99)
HCT VFR BLD AUTO: 35.8 % (ref 34–46.6)
HGB BLD-MCNC: 12.2 G/DL (ref 12–15.9)
LYMPHOCYTES # BLD AUTO: 2.9 10*3/MM3 (ref 0.7–3.1)
LYMPHOCYTES NFR BLD AUTO: 28.2 % (ref 19.6–45.3)
MAGNESIUM SERPL-MCNC: 2.3 MG/DL (ref 1.6–2.4)
MCH RBC QN AUTO: 29.8 PG (ref 26.6–33)
MCHC RBC AUTO-ENTMCNC: 34.2 G/DL (ref 31.5–35.7)
MCV RBC AUTO: 87.1 FL (ref 79–97)
MONOCYTES # BLD AUTO: 1.2 10*3/MM3 (ref 0.1–0.9)
MONOCYTES NFR BLD AUTO: 11.3 % (ref 5–12)
NEUTROPHILS NFR BLD AUTO: 59.8 % (ref 42.7–76)
NEUTROPHILS NFR BLD AUTO: 6.2 10*3/MM3 (ref 1.7–7)
NRBC BLD AUTO-RTO: 0 /100 WBC (ref 0–0.2)
PLATELET # BLD AUTO: 503 10*3/MM3 (ref 140–450)
PMV BLD AUTO: 8.4 FL (ref 6–12)
POTASSIUM SERPL-SCNC: 4.2 MMOL/L (ref 3.5–5.2)
PROT SERPL-MCNC: 6.3 G/DL (ref 6–8.5)
RBC # BLD AUTO: 4.11 10*6/MM3 (ref 3.77–5.28)
SODIUM SERPL-SCNC: 141 MMOL/L (ref 136–145)
WBC NRBC COR # BLD: 10.4 10*3/MM3 (ref 3.4–10.8)

## 2022-02-05 PROCEDURE — 85025 COMPLETE CBC W/AUTO DIFF WBC: CPT

## 2022-02-05 PROCEDURE — 36415 COLL VENOUS BLD VENIPUNCTURE: CPT

## 2022-02-05 PROCEDURE — 99233 SBSQ HOSP IP/OBS HIGH 50: CPT | Performed by: INTERNAL MEDICINE

## 2022-02-05 PROCEDURE — 25010000002 ENOXAPARIN PER 10 MG: Performed by: INTERNAL MEDICINE

## 2022-02-05 PROCEDURE — 85652 RBC SED RATE AUTOMATED: CPT

## 2022-02-05 PROCEDURE — 94618 PULMONARY STRESS TESTING: CPT

## 2022-02-05 PROCEDURE — 80053 COMPREHEN METABOLIC PANEL: CPT

## 2022-02-05 PROCEDURE — 83735 ASSAY OF MAGNESIUM: CPT | Performed by: INTERNAL MEDICINE

## 2022-02-05 RX ADMIN — MIDODRINE HYDROCHLORIDE 10 MG: 5 TABLET ORAL at 08:44

## 2022-02-05 RX ADMIN — SODIUM CHLORIDE, PRESERVATIVE FREE 10 ML: 5 INJECTION INTRAVENOUS at 20:15

## 2022-02-05 RX ADMIN — Medication 5 MG: at 20:14

## 2022-02-05 RX ADMIN — MIDODRINE HYDROCHLORIDE 10 MG: 5 TABLET ORAL at 11:57

## 2022-02-05 RX ADMIN — DEXAMETHASONE 6 MG: 6 TABLET ORAL at 08:44

## 2022-02-05 RX ADMIN — MIRTAZAPINE 15 MG: 15 TABLET, FILM COATED ORAL at 20:14

## 2022-02-05 RX ADMIN — DIPHENHYDRAMINE HYDROCHLORIDE AND LIDOCAINE HYDROCHLORIDE AND ALUMINUM HYDROXIDE AND MAGNESIUM HYDRO 10 ML: KIT at 16:52

## 2022-02-05 RX ADMIN — DIPHENHYDRAMINE HYDROCHLORIDE AND LIDOCAINE HYDROCHLORIDE AND ALUMINUM HYDROXIDE AND MAGNESIUM HYDRO 10 ML: KIT at 20:15

## 2022-02-05 RX ADMIN — MIDODRINE HYDROCHLORIDE 10 MG: 5 TABLET ORAL at 16:52

## 2022-02-05 RX ADMIN — DOCUSATE SODIUM 50 MG AND SENNOSIDES 8.6 MG 2 TABLET: 8.6; 5 TABLET, FILM COATED ORAL at 20:14

## 2022-02-05 RX ADMIN — DIPHENHYDRAMINE HYDROCHLORIDE AND LIDOCAINE HYDROCHLORIDE AND ALUMINUM HYDROXIDE AND MAGNESIUM HYDRO 10 ML: KIT at 11:57

## 2022-02-05 RX ADMIN — ENOXAPARIN SODIUM 60 MG: 60 INJECTION SUBCUTANEOUS at 02:08

## 2022-02-05 RX ADMIN — DIPHENHYDRAMINE HYDROCHLORIDE AND LIDOCAINE HYDROCHLORIDE AND ALUMINUM HYDROXIDE AND MAGNESIUM HYDRO 10 ML: KIT at 08:45

## 2022-02-05 RX ADMIN — ENOXAPARIN SODIUM 60 MG: 60 INJECTION SUBCUTANEOUS at 13:38

## 2022-02-05 RX ADMIN — PANTOPRAZOLE SODIUM 40 MG: 40 TABLET, DELAYED RELEASE ORAL at 08:44

## 2022-02-05 RX ADMIN — MECLIZINE HYDROCHLORIDE 25 MG: 25 TABLET ORAL at 08:44

## 2022-02-05 RX ADMIN — MECLIZINE HYDROCHLORIDE 25 MG: 25 TABLET ORAL at 20:14

## 2022-02-05 RX ADMIN — SODIUM CHLORIDE, PRESERVATIVE FREE 10 ML: 5 INJECTION INTRAVENOUS at 08:43

## 2022-02-05 NOTE — PROGRESS NOTES
Daily Progress Note        Acute hypoxemic respiratory failure due to COVID-19 (HCC)    Pneumonia due to COVID-19 virus    Dizziness    Acute UTI (urinary tract infection)    Dehydration    GERD without esophagitis    Tobacco use disorder    IBS (irritable bowel syndrome)    Myeloproliferative disorder (HCC)    Primary osteoarthritis involving multiple joints    Adjustment disorder with depressed mood    Mixed hyperlipidemia    Cytokine release syndrome, grade 1      Assessment    Pneumonia due to COVID-19 virus  Acute hypoxemic respiratory failure: ABGs 1/23/2022, pH 7.49, PCO2 33.1, PO2 53.4   sepsis: Fever with increasing C-reactive protein and worsening bilateral pulmonary infiltrates on 1/22/2022: New bacterial pneumonia due to nonspecific pathogen  Increasing D-dimer  UTI  Hypotension and dehydration  Tobacco smoking     Echo 1/21/2022  -EF 61-65%     Blood cultures negative at 24 hours  MRSA negative     Venous lower extremity Doppler 1/24/2022  -Negative     Plan     Continue to titrate oxygen, BiPAP as needed  -Currently on  8L nc    Wean Decadron to 4 mg daily  Midodrine  Inhaled corticosteroids  Bronchodilators  Electrolyte/Glycemic control  DVT/GI Prophylaxis-Lovenox and Protonix    Completed course of Rocephin for urinary tract infection    Patient out of the window for remdesivir     1/17/2022        COVID-19 LAB PANEL     COVID-19 test result  COVID19   Date Value Ref Range Status   01/17/2022 Detected (C) Not Detected - Ref. Range Final       COVID-19 Prognostic lab results  WBC with differential  Results from last 7 days   Lab Units 02/05/22  0503 02/04/22  0408 02/03/22  0413 02/02/22  0452 02/01/22  0314   WBC 10*3/mm3 10.40 11.10* 9.00 9.70 9.30   PLATELETS 10*3/mm3 503* 557* 567* 584* 614*   NEUTROPHIL % % 59.8 65.5 61.0 70.4 68.4   LYMPHOCYTE % % 28.2 22.8 24.7 15.1* 16.6*   NEUTROS ABS 10*3/mm3 6.20 7.30* 5.50 6.80 6.30   LYMPHS ABS 10*3/mm3 2.90 2.50 2.20 1.50 1.50     Inflammatory  markers  Results from last 7 days   Lab Units 02/05/22  0503 02/04/22  0408 02/03/22  0413 02/02/22  0452 02/01/22  0314 01/31/22  0335   D DIMER QUANT mg/L (FEU)  --  0.42  --  0.57  --  0.62*   ALK PHOS U/L 116 124* 123* 117 135*  --    AST (SGOT) U/L 22 27 24 21 25  --    ALT (SGPT) U/L 54* 60* 46* 52* 71*  --        Poor COVID-19 outcomes associated with:  Neutrophil:Lymphocyte ratio >3.5  Thrombocytopenia, lymphopenia  LFT's >5x upper limit of normal  LDH >400     LOS: 19 days     Subjective         Objective     Vital signs for last 24 hours:  Vitals:    02/04/22 1947 02/04/22 2123 02/05/22 0150 02/05/22 0518   BP:  122/59 135/63 114/55   BP Location:       Patient Position:       Pulse: 64 57 73 77   Resp:  18 18 18   Temp:  97.4 °F (36.3 °C) 97.6 °F (36.4 °C) 96.8 °F (36 °C)   TempSrc:  Oral Axillary Oral   SpO2: 95% 95% 94% 97%   Weight:    62.3 kg (137 lb 5.6 oz)   Height:           Intake/Output last 3 shifts:  I/O last 3 completed shifts:  In: 960 [P.O.:960]  Out: 1025 [Urine:1025]  Intake/Output this shift:  No intake/output data recorded.      Radiology  Imaging Results (Last 24 Hours)     ** No results found for the last 24 hours. **          Labs:  Results from last 7 days   Lab Units 02/05/22  0503   WBC 10*3/mm3 10.40   HEMOGLOBIN g/dL 12.2   HEMATOCRIT % 35.8   PLATELETS 10*3/mm3 503*     Results from last 7 days   Lab Units 02/05/22  0503   SODIUM mmol/L 141   POTASSIUM mmol/L 4.2   CHLORIDE mmol/L 104   CO2 mmol/L 29.0   BUN mg/dL 14   CREATININE mg/dL 0.51*   CALCIUM mg/dL 8.9   BILIRUBIN mg/dL 0.4   ALK PHOS U/L 116   ALT (SGPT) U/L 54*   AST (SGOT) U/L 22   GLUCOSE mg/dL 81         Results from last 7 days   Lab Units 02/05/22  0503 02/04/22  0408 02/03/22  0413   ALBUMIN g/dL 3.00* 2.80* 2.60*             Results from last 7 days   Lab Units 02/05/22  0503   MAGNESIUM mg/dL 2.3                   Meds:   SCHEDULE  dexamethasone, 6 mg, Oral, Daily With Breakfast  enoxaparin, 60 mg,  Subcutaneous, Q12H  First Mouthwash (Magic Mouthwash), 10 mL, Swish & Spit, Q6H  meclizine, 25 mg, Oral, BID  midodrine, 10 mg, Oral, TID AC  mirtazapine, 15 mg, Oral, Nightly  pantoprazole, 40 mg, Oral, QAM  senna-docusate sodium, 2 tablet, Oral, BID  sodium chloride, 10 mL, Intravenous, Q12H      Infusions     PRNs  •  acetaminophen **OR** acetaminophen **OR** acetaminophen  •  albuterol sulfate HFA  •  aluminum-magnesium hydroxide-simethicone  •  benzonatate  •  senna-docusate sodium **AND** polyethylene glycol **AND** bisacodyl **AND** bisacodyl  •  Lidocaine Viscous HCl  •  magnesium sulfate **OR** magnesium sulfate in D5W 1g/100mL (PREMIX)  •  melatonin  •  nitroglycerin  •  ondansetron **OR** ondansetron  •  potassium chloride  •  potassium chloride  •  potassium phosphate infusion greater than 15 mMoles **OR** potassium phosphate infusion greater than 15 mMoles **OR** potassium phosphate **OR** sodium phosphate IVPB **OR** sodium phosphate IVPB **OR** sodium phosphate IVPB  •  sodium chloride  •  sodium chloride  •  sodium chloride    Physical Exam:  Physical Exam  Vitals reviewed.   Pulmonary:      Breath sounds: Decreased breath sounds present.   Skin:     General: Skin is warm and dry.   Neurological:      Mental Status: She is alert.         ROS  Review of Systems   Respiratory: Positive for shortness of breath.      I have reviewed the patient's new clinical results    Electronically signed by KARIN Peres

## 2022-02-05 NOTE — PROGRESS NOTES
[facility] Medicine Services Daily Progress Note    Patient Name: Nila Jin  : 1945  MRN: [id number]  Primary Care Physician:  Baudilio Roldan IV, MD  Date of admission: 2022      Subjective    From previous note and with minor changes.  Chief Complaint: COVID, diarrhea    Patient reports a significant improvement in her symptoms.  Patient is currently on 6L via nasal cannula.  Patient was states that and was found to be needing 10 L at home.  Patient Reports denies any complaints.   Patient was initially  moved to PCU for BiPAP several days ago.    ROS negative except as above      Objective      Vitals:   Temp:  [96.8 °F (36 °C)-98.2 °F (36.8 °C)] 97.9 °F (36.6 °C)  Heart Rate:  [57-81] 81  Resp:  [18-20] 20  BP: (106-135)/(52-63) 106/54  Flow (L/min):  [6] 6       Vitals reviewed.  Stable.  HENT:      Head: Normocephalic.      Nose: Nose normal.      Mouth/Throat:      Mouth: Mucous membranes are moist.   Eyes:      Pupils: Pupils are equal, round, and reactive to light.   Cardiovascular:      Rate and Rhythm: Normal rate and regular rhythm.      Pulses: Normal pulses.      Heart sounds: Normal heart sounds.   Pulmonary:      Effort: Pulmonary effort is normal.      Breath sounds: mild rhonchi present.   Abdominal:      General: Abdomen is flat.      Palpations: Abdomen is soft.   Musculoskeletal:         General: Swelling present. Normal range of motion.      Cervical back: Normal range of motion.   Skin:     General: Skin is warm.      Capillary Refill: Capillary refill takes less than 2 seconds.   Neurological:      General: No focal deficit present.      Mental Status: She is alert and oriented to person, place, and time.   Psychiatric:         Mood and Affect: Flat affect.         Behavior: Behavior normal.     Result Review    Result Review:  I have personally reviewed the results from the time of this admission to 2022 11:05 EST and agree with these  findings:  [x]  Laboratory  [x]  Microbiology  [x]  Radiology  []  EKG/Telemetry   []  Cardiology/Vascular   []  Pathology  []  Old records  []  Other:  Most notable findings include:      Procedure Component Value Units Date/Time   XR Chest 1 View [[id number]] Alireza as Reviewed   Order Status: Completed Collected: 01/31/22 0852    Updated: 01/31/22 0855   Narrative:     DATE OF EXAM:   1/31/2022 8:43 AM       PROCEDURE:   XR CHEST 1 VW-       INDICATIONS:   COVID-19 pneumonia; U07.1-COVID-19; J12.82-Pneumonia due to coronavirus   disease 2019; E86.0-Dehydration; R42-Dizziness and giddiness;   N39.0-Urinary tract infection, site not specified; R09.02-Hypoxemia       COMPARISON:   01/27/2022       TECHNIQUE:   Single radiographic AP view of the chest was obtained.       FINDINGS:   Cardiac size remains normal. There is patchy airspace disease in the   left lung base. The vascular markings in the chest appear normal. No   pleural fluid is seen.       Impression:     Patchy airspace disease in the left lung base consistent with pneumonia.       Electronically Signed By-Reddy Marquez MD On:1/31/2022 8:53 AM   This report was finalized on [id number] by  Reddy Marquez MD.               Assessment/Plan      Brief Patient Summary:  Patient is a 76 y.o. female with a history of primary insomnia, IBS, generalized anxiety disorder, hyperlipidemia, tobacco use disorder, myeloproliferative disorder, primary insomnia, primary osteoarthritis and chronic nonseasonal allergic rhinitis due to pollen and GERD who presented to the ER at [facility] on 1/17/2022 complaining of dizziness. The patient states she felt like she might pass out. She denies any chest pain. She reports some mild shortness of breath and a nonproductive cough. She states she initially started feeling bad 2-3 weeks ago. She reports fatigue, nausea, and some loose stools. She denies any exacerbating or alleviating factors. She has not been vaccinated for  Covid. She does not wear home oxygen. She states she smokes 1 ppd, but hasn't smoked for the past week because she felt so bad.   Patient was seen in the ER and work-up revealed Covid pneumonia, UTI, and dehydration. The patient was given IV fluids and ceftriaxone. She was also given Zofran and meclizine. Her oxygen saturation was initially in the mid 90s on room air, but then she started dropping down so she was placed on supplemental oxygen with improvement. She was admitted to the Hospitalist group for further treatment.        dexamethasone, 6 mg, Oral, Daily With Breakfast  enoxaparin, 60 mg, Subcutaneous, Q12H  First Mouthwash (Magic Mouthwash), 10 mL, Swish & Spit, Q6H  meclizine, 25 mg, Oral, BID  midodrine, 10 mg, Oral, TID AC  mirtazapine, 15 mg, Oral, Nightly  pantoprazole, 40 mg, Oral, QAM  senna-docusate sodium, 2 tablet, Oral, BID  sodium chloride, 10 mL, Intravenous, Q12H             Active Hospital Problems:  Active Hospital Problems    Diagnosis    • **Acute hypoxemic respiratory failure due to COVID-19 (HCC)    • Pneumonia due to COVID-19 virus    • Acute UTI (urinary tract infection)    • Cytokine release syndrome, grade 1    • Mixed hyperlipidemia    • Adjustment disorder with depressed mood    • Dizziness    • Dehydration    • IBS (irritable bowel syndrome)    • GERD without esophagitis    • Tobacco use disorder    • Myeloproliferative disorder (HCC)    • Primary osteoarthritis involving multiple joints        Plan:   Pneumonia due to COVID-19 virus  Acute hypoxemic respiratory failure due to COVID-19   -baseline:  Room air  -Patient is currently on nasal cannula 5 L.  -titrate O2 to keep sat >92%  -Decadron x 10 days  -Remdesivir not indicated as patient's symptoms started 2-3 weeks ago  -Lovenox per VTE prophylaxis  -encourage IS  -enhanced droplet/contact isolation   -PRN albuterol and Tessalon Perles   - Check echocardiogram -no abnormalities  -Patient is now persistently febrile we will  consult ID for assistance  -Hypoxemia despite high flow.  BiPAP ordered.    And moved to PCU on January 23.  She does seem clinically comfortable.  Pulmonary consult requested appreciate assistance     Dizziness  -likely secondary to dehydration  -meclizine TID PRN, dose increased on January 21 for recurrent hypotension orthostatic in nature  Echo normal  Orthostatics positive  Midodrine started and doubled to 10 3 times daily  Continue  cc an hour     Acute UTI (urinary tract infection)  -urine culture sent  -Treated with ceftriaxone     Dehydration  -secondary to poor oral intake  -IVF     Acid reflux  -continue home medication once list verified      Tobacco abuse  -encourage cessation  -patient refused nicotine patch      Depressive symptoms  Psychiatry consulted  Appreciate their input     Fever tachycardia worsening hypoxemia  The patient was started on IV antibiotics      DVT prophylaxis:  Medical DVT prophylaxis orders are present.     CODE STATUS:    Code Status (Patient has no pulse and is not breathing): CPR (Attempt to Resuscitate)  Medical Interventions (Patient has pulse or is breathing): Full Support     Disposition: This wonderful patient may discharge in the next 24 hours depending on oxygen requirement.     I discussed the patient's findings and my recommendations with patient and family at bedside.     This patient has been examined wearing appropriate Personal Protective Equipment.   02/05/22      Electronically signed by Trevon Watson MD, FACP,  02/05/22, 11:05 EST.      [facility]

## 2022-02-05 NOTE — PROGRESS NOTES
Exercise Oximetry    Patient Name:Nila Jin   MRN: [id number]   Date: 02/05/22             ROOM AIR BASELINE   SpO2% 92   Heart Rate 92   Blood Pressure      EXERCISE ON ROOM AIR SpO2% EXERCISE ON O2 @ 10 LPM SpO2%   1 MINUTE 86 1 MINUTE    2 MINUTES  2 MINUTES 82%   3 MINUTES  3 MINUTES 85%   4 MINUTES  4 MINUTES 85%   5 MINUTES  5 MINUTES 82%   6 MINUTES  6 MINUTES 79%              Distance Walked   Distance Walked   Dyspnea (Dandy Scale)   Dyspnea (Dandy Scale)   Fatigue (Dandy Scale)   Fatigue (Dandy Scale)   SpO2% Post Exercise   SpO2% Post Exercise 93%   HR Post Exercise HR Post Exercise 83   Time to Recovery   Time to Recovery 5 minutes     Comments: Patient did okay at rest on room air. Patient couldn't walk very well so I had her standing up and down by the side of the bed. Patient's sats dropped quickly and continued to drop despite giving her more oxygen. Patient finally maintained sats above 90%. Patient will required 10 liters of oxygen.

## 2022-02-05 NOTE — PLAN OF CARE
Goal Outcome Evaluation:  Patient remains on 6 liters of oxygen with saturation in the mid to upper 90s at rest. Patient oxygen desats with activity to the upper 80s. No new complaints. Anticipate discharge today with home oxygen.

## 2022-02-06 LAB
D DIMER PPP FEU-MCNC: 0.5 MG/L (FEU) (ref 0–0.59)
MAGNESIUM SERPL-MCNC: 1.9 MG/DL (ref 1.6–2.4)
POTASSIUM SERPL-SCNC: 4.3 MMOL/L (ref 3.5–5.2)

## 2022-02-06 PROCEDURE — 85379 FIBRIN DEGRADATION QUANT: CPT | Performed by: INTERNAL MEDICINE

## 2022-02-06 PROCEDURE — 99233 SBSQ HOSP IP/OBS HIGH 50: CPT | Performed by: INTERNAL MEDICINE

## 2022-02-06 PROCEDURE — 84132 ASSAY OF SERUM POTASSIUM: CPT | Performed by: INTERNAL MEDICINE

## 2022-02-06 PROCEDURE — 83735 ASSAY OF MAGNESIUM: CPT | Performed by: INTERNAL MEDICINE

## 2022-02-06 PROCEDURE — 97116 GAIT TRAINING THERAPY: CPT

## 2022-02-06 PROCEDURE — 25010000002 ENOXAPARIN PER 10 MG: Performed by: INTERNAL MEDICINE

## 2022-02-06 PROCEDURE — 97110 THERAPEUTIC EXERCISES: CPT

## 2022-02-06 PROCEDURE — 36415 COLL VENOUS BLD VENIPUNCTURE: CPT | Performed by: INTERNAL MEDICINE

## 2022-02-06 RX ORDER — DEXAMETHASONE 4 MG/1
4 TABLET ORAL
Status: DISCONTINUED | OUTPATIENT
Start: 2022-02-07 | End: 2022-02-07 | Stop reason: HOSPADM

## 2022-02-06 RX ADMIN — DOCUSATE SODIUM 50 MG AND SENNOSIDES 8.6 MG 2 TABLET: 8.6; 5 TABLET, FILM COATED ORAL at 20:59

## 2022-02-06 RX ADMIN — Medication 5 MG: at 21:26

## 2022-02-06 RX ADMIN — SODIUM CHLORIDE, PRESERVATIVE FREE 10 ML: 5 INJECTION INTRAVENOUS at 21:05

## 2022-02-06 RX ADMIN — DIPHENHYDRAMINE HYDROCHLORIDE AND LIDOCAINE HYDROCHLORIDE AND ALUMINUM HYDROXIDE AND MAGNESIUM HYDRO 10 ML: KIT at 10:54

## 2022-02-06 RX ADMIN — SODIUM CHLORIDE, PRESERVATIVE FREE 10 ML: 5 INJECTION INTRAVENOUS at 08:11

## 2022-02-06 RX ADMIN — DIPHENHYDRAMINE HYDROCHLORIDE AND LIDOCAINE HYDROCHLORIDE AND ALUMINUM HYDROXIDE AND MAGNESIUM HYDRO 10 ML: KIT at 17:05

## 2022-02-06 RX ADMIN — DIPHENHYDRAMINE HYDROCHLORIDE AND LIDOCAINE HYDROCHLORIDE AND ALUMINUM HYDROXIDE AND MAGNESIUM HYDRO 10 ML: KIT at 06:16

## 2022-02-06 RX ADMIN — PANTOPRAZOLE SODIUM 40 MG: 40 TABLET, DELAYED RELEASE ORAL at 06:16

## 2022-02-06 RX ADMIN — MIDODRINE HYDROCHLORIDE 10 MG: 5 TABLET ORAL at 10:54

## 2022-02-06 RX ADMIN — MIRTAZAPINE 15 MG: 15 TABLET, FILM COATED ORAL at 20:59

## 2022-02-06 RX ADMIN — MECLIZINE HYDROCHLORIDE 25 MG: 25 TABLET ORAL at 08:10

## 2022-02-06 RX ADMIN — DEXAMETHASONE 6 MG: 6 TABLET ORAL at 08:10

## 2022-02-06 RX ADMIN — ENOXAPARIN SODIUM 60 MG: 60 INJECTION SUBCUTANEOUS at 13:08

## 2022-02-06 RX ADMIN — ENOXAPARIN SODIUM 60 MG: 60 INJECTION SUBCUTANEOUS at 02:02

## 2022-02-06 RX ADMIN — MIDODRINE HYDROCHLORIDE 10 MG: 5 TABLET ORAL at 08:10

## 2022-02-06 RX ADMIN — MIDODRINE HYDROCHLORIDE 10 MG: 5 TABLET ORAL at 17:05

## 2022-02-06 RX ADMIN — MECLIZINE HYDROCHLORIDE 25 MG: 25 TABLET ORAL at 20:59

## 2022-02-06 NOTE — PROGRESS NOTES
Daily Progress Note        Acute hypoxemic respiratory failure due to COVID-19 (HCC)    Pneumonia due to COVID-19 virus    Dizziness    Acute UTI (urinary tract infection)    Dehydration    GERD without esophagitis    Tobacco use disorder    IBS (irritable bowel syndrome)    Myeloproliferative disorder (HCC)    Primary osteoarthritis involving multiple joints    Adjustment disorder with depressed mood    Mixed hyperlipidemia    Cytokine release syndrome, grade 1      Assessment    Pneumonia due to COVID-19 virus  Acute hypoxemic respiratory failure: ABGs 1/23/2022, pH 7.49, PCO2 33.1, PO2 53.4   sepsis: Fever with increasing C-reactive protein and worsening bilateral pulmonary infiltrates on 1/22/2022: New bacterial pneumonia due to nonspecific pathogen  Increasing D-dimer  UTI  Hypotension and dehydration  Tobacco smoking     Echo 1/21/2022  -EF 61-65%     Blood cultures negative at 24 hours  MRSA negative     Venous lower extremity Doppler 1/24/2022  -Negative     Plan     Continue to titrate oxygen, BiPAP as needed  -Currently on  6L nc    Wean Decadron to 4 mg daily  Midodrine  Inhaled corticosteroids  Bronchodilators  Electrolyte/Glycemic control  DVT/GI Prophylaxis-Lovenox and Protonix    Completed course of Rocephin for urinary tract infection    Patient out of the window for remdesivir    2/6/2022 okay to come out of Covid isolation.  Initial positive test date 1/17/2022 1/17/2022        COVID-19 LAB PANEL     COVID-19 test result  COVID19   Date Value Ref Range Status   01/17/2022 Detected (C) Not Detected - Ref. Range Final       COVID-19 Prognostic lab results  WBC with differential  Results from last 7 days   Lab Units 02/05/22  0503 02/04/22  0408 02/03/22  0413 02/02/22  0452 02/01/22  0314   WBC 10*3/mm3 10.40 11.10* 9.00 9.70 9.30   PLATELETS 10*3/mm3 503* 557* 567* 584* 614*   NEUTROPHIL % % 59.8 65.5 61.0 70.4 68.4   LYMPHOCYTE % % 28.2 22.8 24.7 15.1* 16.6*   NEUTROS ABS 10*3/mm3 6.20 7.30*  5.50 6.80 6.30   LYMPHS ABS 10*3/mm3 2.90 2.50 2.20 1.50 1.50     Inflammatory markers  Results from last 7 days   Lab Units 02/06/22  0512 02/05/22  0503 02/04/22  0408 02/03/22  0413 02/02/22  0452 02/01/22  0314 01/31/22  0335   D DIMER QUANT mg/L (FEU) 0.50  --  0.42  --  0.57  --  0.62*   ALK PHOS U/L  --  116 124* 123* 117 135*  --    AST (SGOT) U/L  --  22 27 24 21 25  --    ALT (SGPT) U/L  --  54* 60* 46* 52* 71*  --        Poor COVID-19 outcomes associated with:  Neutrophil:Lymphocyte ratio >3.5  Thrombocytopenia, lymphopenia  LFT's >5x upper limit of normal  LDH >400     LOS: 20 days     Subjective         Objective     Vital signs for last 24 hours:  Vitals:    02/05/22 2208 02/06/22 0123 02/06/22 0548 02/06/22 0605   BP: 129/56 145/72 115/55    Pulse: 69 62 80    Resp: 18 18 18    Temp: 97.4 °F (36.3 °C) 97.7 °F (36.5 °C) 97.5 °F (36.4 °C)    TempSrc: Oral Axillary     SpO2: 97% 97% 97%    Weight:    62.9 kg (138 lb 10.7 oz)   Height:           Intake/Output last 3 shifts:  I/O last 3 completed shifts:  In: 1320 [P.O.:1320]  Out: 1700 [Urine:1700]  Intake/Output this shift:  No intake/output data recorded.      Radiology  Imaging Results (Last 24 Hours)     ** No results found for the last 24 hours. **          Labs:  Results from last 7 days   Lab Units 02/05/22  0503   WBC 10*3/mm3 10.40   HEMOGLOBIN g/dL 12.2   HEMATOCRIT % 35.8   PLATELETS 10*3/mm3 503*     Results from last 7 days   Lab Units 02/06/22  0512 02/05/22  0503 02/05/22  0503   SODIUM mmol/L  --   --  141   POTASSIUM mmol/L 4.3   < > 4.2   CHLORIDE mmol/L  --   --  104   CO2 mmol/L  --   --  29.0   BUN mg/dL  --   --  14   CREATININE mg/dL  --   --  0.51*   CALCIUM mg/dL  --   --  8.9   BILIRUBIN mg/dL  --   --  0.4   ALK PHOS U/L  --   --  116   ALT (SGPT) U/L  --   --  54*   AST (SGOT) U/L  --   --  22   GLUCOSE mg/dL  --   --  81    < > = values in this interval not displayed.         Results from last 7 days   Lab Units 02/05/22  2453  02/04/22  0408 02/03/22  0413   ALBUMIN g/dL 3.00* 2.80* 2.60*             Results from last 7 days   Lab Units 02/06/22  0512   MAGNESIUM mg/dL 1.9                   Meds:   SCHEDULE  dexamethasone, 6 mg, Oral, Daily With Breakfast  enoxaparin, 60 mg, Subcutaneous, Q12H  First Mouthwash (Magic Mouthwash), 10 mL, Swish & Spit, Q6H  meclizine, 25 mg, Oral, BID  midodrine, 10 mg, Oral, TID AC  mirtazapine, 15 mg, Oral, Nightly  pantoprazole, 40 mg, Oral, QAM  senna-docusate sodium, 2 tablet, Oral, BID  sodium chloride, 10 mL, Intravenous, Q12H      Infusions     PRNs  •  acetaminophen **OR** acetaminophen **OR** acetaminophen  •  albuterol sulfate HFA  •  aluminum-magnesium hydroxide-simethicone  •  benzonatate  •  senna-docusate sodium **AND** polyethylene glycol **AND** bisacodyl **AND** bisacodyl  •  Lidocaine Viscous HCl  •  magnesium sulfate **OR** magnesium sulfate in D5W 1g/100mL (PREMIX)  •  melatonin  •  nitroglycerin  •  ondansetron **OR** ondansetron  •  potassium chloride  •  potassium chloride  •  potassium phosphate infusion greater than 15 mMoles **OR** potassium phosphate infusion greater than 15 mMoles **OR** potassium phosphate **OR** sodium phosphate IVPB **OR** sodium phosphate IVPB **OR** sodium phosphate IVPB  •  sodium chloride  •  sodium chloride  •  sodium chloride    Physical Exam:  Physical Exam  Vitals reviewed.   Pulmonary:      Breath sounds: Decreased breath sounds present.   Skin:     General: Skin is warm and dry.   Neurological:      Mental Status: She is alert.         ROS  Review of Systems   Respiratory: Positive for shortness of breath.      I have reviewed the patient's new clinical results    Electronically signed by KARIN Peres

## 2022-02-06 NOTE — PROGRESS NOTES
[facility] Medicine Services Daily Progress Note    Patient Name: Nila Jin  : 1945  MRN: [id number]  Primary Care Physician:  Baudilio Roldan IV, MD  Date of admission: 2022      Subjective    From previous note and with minor changes.  Chief Complaint: COVID, diarrhea    Patient reports a significant improvement in her symptoms.  Patient is currently on 6L via nasal cannula.  Patient will be tested for need for oxygen with activity.  Patient Reports denies any complaints.   Patient was initially  moved to PCU for BiPAP several days ago.    ROS negative except as above      Objective      Vitals:   Temp:  [97.3 °F (36.3 °C)-98.5 °F (36.9 °C)] 97.3 °F (36.3 °C)  Heart Rate:  [62-80] 80  Resp:  [18-20] 20  BP: (105-145)/(45-72) 120/59  Flow (L/min):  [3-6] 3       Vitals reviewed.  Stable.  HENT:      Head: Normocephalic.      Nose: Nose normal.      Mouth/Throat:      Mouth: Mucous membranes are moist.   Eyes:      Pupils: Pupils are equal, round, and reactive to light.   Cardiovascular:      Rate and Rhythm: Normal rate and regular rhythm.      Pulses: Normal pulses.      Heart sounds: Normal heart sounds.   Pulmonary:      Effort: Pulmonary effort is normal.      Breath sounds: mild rhonchi present.   Abdominal:      General: Abdomen is flat.      Palpations: Abdomen is soft.   Musculoskeletal:         General: Swelling present. Normal range of motion.      Cervical back: Normal range of motion.   Skin:     General: Skin is warm.      Capillary Refill: Capillary refill takes less than 2 seconds.   Neurological:      General: No focal deficit present.      Mental Status: She is alert and oriented to person, place, and time.   Psychiatric:         Mood and Affect: Flat affect.         Behavior: Behavior normal.     Result Review    Result Review:  I have personally reviewed the results from the time of this admission to 2022 17:14 EST and agree with these findings:  [x]   Laboratory  [x]  Microbiology  [x]  Radiology  []  EKG/Telemetry   []  Cardiology/Vascular   []  Pathology  []  Old records  []  Other:  Most notable findings include:      Procedure Component Value Units Date/Time   XR Chest 1 View [[id number]] Alireza as Reviewed   Order Status: Completed Collected: 01/31/22 0852    Updated: 01/31/22 0855   Narrative:     DATE OF EXAM:   1/31/2022 8:43 AM       PROCEDURE:   XR CHEST 1 VW-       INDICATIONS:   COVID-19 pneumonia; U07.1-COVID-19; J12.82-Pneumonia due to coronavirus   disease 2019; E86.0-Dehydration; R42-Dizziness and giddiness;   N39.0-Urinary tract infection, site not specified; R09.02-Hypoxemia       COMPARISON:   01/27/2022       TECHNIQUE:   Single radiographic AP view of the chest was obtained.       FINDINGS:   Cardiac size remains normal. There is patchy airspace disease in the   left lung base. The vascular markings in the chest appear normal. No   pleural fluid is seen.       Impression:     Patchy airspace disease in the left lung base consistent with pneumonia.       Electronically Signed By-Reddy Marquez MD On:1/31/2022 8:53 AM   This report was finalized on [id number] by  Reddy Marquez MD.               Assessment/Plan      Brief Patient Summary:  Patient is a 76 y.o. female with a history of myeloproliferative disorder, primary insomnia, IBS, generalized anxiety disorder, hyperlipidemia, tobacco use disorder, myeloproliferative disorder, primary insomnia, primary osteoarthritis and chronic nonseasonal allergic rhinitis due to pollen and GERD who presented to the ER at [facility] on 1/17/2022 complaining of dizziness. The patient states she felt like she might pass out. She denies any chest pain. She reports some mild shortness of breath and a nonproductive cough. She states she initially started feeling bad 2-3 weeks ago. She reports fatigue, nausea, and some loose stools. She denies any exacerbating or alleviating factors. She has not been  vaccinated for Covid. She does not wear home oxygen. She states she smokes 1 ppd, but hasn't smoked for the past week because she felt so bad.   Patient was seen in the ER and work-up revealed Covid pneumonia, UTI, and dehydration. The patient was given IV fluids and ceftriaxone. She was also given Zofran and meclizine. Her oxygen saturation was initially in the mid 90s on room air, but then she started dropping down so she was placed on supplemental oxygen with improvement. She was admitted to the Hospitalist group for further treatment.        [START ON 2/7/2022] dexamethasone, 4 mg, Oral, Daily With Breakfast  enoxaparin, 60 mg, Subcutaneous, Q12H  First Mouthwash (Magic Mouthwash), 10 mL, Swish & Spit, Q6H  meclizine, 25 mg, Oral, BID  midodrine, 10 mg, Oral, TID AC  mirtazapine, 15 mg, Oral, Nightly  pantoprazole, 40 mg, Oral, QAM  senna-docusate sodium, 2 tablet, Oral, BID  sodium chloride, 10 mL, Intravenous, Q12H             Active Hospital Problems:  Active Hospital Problems    Diagnosis    • **Acute hypoxemic respiratory failure due to COVID-19 (HCC)    • Pneumonia due to COVID-19 virus    • Acute UTI (urinary tract infection)    • Cytokine release syndrome, grade 1    • Mixed hyperlipidemia    • Adjustment disorder with depressed mood    • Dizziness    • Dehydration    • IBS (irritable bowel syndrome)    • GERD without esophagitis    • Tobacco use disorder    • Myeloproliferative disorder (HCC)    • Primary osteoarthritis involving multiple joints        Plan:   Pneumonia due to COVID-19 virus  Acute hypoxemic respiratory failure due to COVID-19   -baseline:  Room air  -Patient is currently on nasal cannula 5 L.  -titrate O2 to keep sat >92%  -Decadron x 10 days  -Remdesivir not indicated as patient's symptoms started 2-3 weeks ago  -Lovenox per VTE prophylaxis  -encourage IS  -enhanced droplet/contact isolation   -PRN albuterol and Tessalon Perles   - Check echocardiogram -no abnormalities  -Patient is  now persistently febrile we will consult ID for assistance  -Hypoxemia despite high flow.  BiPAP ordered.    And moved to PCU on January 23.  She does seem clinically comfortable.  Pulmonary consult requested appreciate assistance     Dizziness  -likely secondary to dehydration  -meclizine TID PRN, dose increased on January 21 for recurrent hypotension orthostatic in nature  Echo normal  Orthostatics positive  Midodrine started and doubled to 10 3 times daily  Continue  cc an hour     Acute UTI (urinary tract infection)  -urine culture sent  -Treated with ceftriaxone     Dehydration  -secondary to poor oral intake  -IVF     Acid reflux  -continue home medication once list verified      Tobacco abuse  -encourage cessation  -patient refused nicotine patch      Depressive symptoms  Psychiatry consulted  Appreciate their input     Fever tachycardia worsening hypoxemia  The patient was started on IV antibiotics      DVT prophylaxis:  Medical DVT prophylaxis orders are present.     CODE STATUS:    Code Status (Patient has no pulse and is not breathing): CPR (Attempt to Resuscitate)  Medical Interventions (Patient has pulse or is breathing): Full Support     Disposition: This wonderful patient may discharge in the next 24 hours depending on oxygen requirement.     I discussed the patient's findings and my recommendations with patient and family at bedside.     This patient has been examined wearing appropriate Personal Protective Equipment.   02/06/22      Electronically signed by Trevon Watson MD, FACP,  02/06/22, 17:14 EST.      [facility] Hospitalist Team

## 2022-02-06 NOTE — PLAN OF CARE
Goal Outcome Evaluation:  Plan of Care Reviewed With: patient        Progress: no change  Outcome Summary: Pt still requiring increased oxygen when walking with RT. Otherwise vitals stable. No c/o pain.

## 2022-02-06 NOTE — THERAPY TREATMENT NOTE
Subjective: Pt agreeable to therapeutic plan of care.     Objective:     Bed mobility - Modified-Independent  Transfers - CGA and with rolling walker  Ambulation - 60 feet CGA and with rolling walker    Pain: 0 VAS  Education: Provided education on importance of mobility and skilled verbal / tactile cueing throughout intervention.     Assessment: Nila Jin presents with functional mobility impairments which indicate the need for skilled intervention. Tolerating session today without incident. Pt on 4L hf, sats 95>90%, HR 82>114.  very good at encouraging her to do more so she can get stronger for home. Plans on getting wc for distance and HH at [facility]. Will continue to follow and progress as tolerated.     Plan/Recommendations:   Pt would benefit from Home with family assist and and Home Health at discharge from facility and requires wheelchair at discharge.   Pt desires Home with family assist and and Home Health at discharge. Pt cooperative; agreeable to therapeutic recommendations and plan of care.     Basic Mobility 6-click:  Rollin = Total, A lot = 2, A little = 3; 4 = None  Supine>Sit:   1 = Total, A lot = 2, A little = 3; 4 = None   Sit>Stand with arms:  1 = Total, A lot = 2, A little = 3; 4 = None  Bed>Chair:   1 = Total, A lot = 2, A little = 3; 4 = None  Ambulate in room:  1 = Total, A lot = 2, A little = 3; 4 = None  3-5 Steps with railin = Total, A lot = 2, A little = 3; 4 = None  Score: 18    Post-Tx Position: Supine with HOB Elevated, Alarms activated and Call light and personal items within reach  PPE: gloves, surgical mask, eyewear protection

## 2022-02-06 NOTE — PLAN OF CARE
Assessment: Nila Jin presents with functional mobility impairments which indicate the need for skilled intervention. Tolerating session today without incident. Pt on 4L hf, sats 95>90%, HR 82>114.  very good at encouraging her to do more so she can get stronger for home. Plans on getting wc for distance and HH at dc. Will continue to follow and progress as tolerated.

## 2022-02-07 ENCOUNTER — READMISSION MANAGEMENT (OUTPATIENT)
Dept: CALL CENTER | Facility: HOSPITAL | Age: 77
End: 2022-02-07

## 2022-02-07 VITALS
SYSTOLIC BLOOD PRESSURE: 102 MMHG | BODY MASS INDEX: 21.76 KG/M2 | TEMPERATURE: 97.9 F | WEIGHT: 138.67 LBS | DIASTOLIC BLOOD PRESSURE: 68 MMHG | HEART RATE: 88 BPM | OXYGEN SATURATION: 97 % | RESPIRATION RATE: 20 BRPM | HEIGHT: 67 IN

## 2022-02-07 LAB
MAGNESIUM SERPL-MCNC: 1.9 MG/DL (ref 1.6–2.4)
POTASSIUM SERPL-SCNC: 4.3 MMOL/L (ref 3.5–5.2)

## 2022-02-07 PROCEDURE — 25010000002 ENOXAPARIN PER 10 MG: Performed by: INTERNAL MEDICINE

## 2022-02-07 PROCEDURE — 94618 PULMONARY STRESS TESTING: CPT

## 2022-02-07 PROCEDURE — 84132 ASSAY OF SERUM POTASSIUM: CPT | Performed by: INTERNAL MEDICINE

## 2022-02-07 PROCEDURE — 63710000001 DEXAMETHASONE PER 0.25 MG

## 2022-02-07 PROCEDURE — 83735 ASSAY OF MAGNESIUM: CPT | Performed by: INTERNAL MEDICINE

## 2022-02-07 PROCEDURE — 99239 HOSP IP/OBS DSCHRG MGMT >30: CPT | Performed by: INTERNAL MEDICINE

## 2022-02-07 RX ORDER — BISACODYL 5 MG/1
5 TABLET, DELAYED RELEASE ORAL DAILY PRN
Qty: 90 TABLET | Refills: 0 | Status: SHIPPED | OUTPATIENT
Start: 2022-02-07 | End: 2022-05-08

## 2022-02-07 RX ORDER — MIRTAZAPINE 15 MG/1
15 TABLET, FILM COATED ORAL NIGHTLY
Qty: 30 TABLET | Refills: 0 | Status: SHIPPED | OUTPATIENT
Start: 2022-02-07 | End: 2022-03-09

## 2022-02-07 RX ORDER — ALBUTEROL SULFATE 90 UG/1
2 AEROSOL, METERED RESPIRATORY (INHALATION) EVERY 6 HOURS PRN
Qty: 18 G | Refills: 2 | Status: SHIPPED | OUTPATIENT
Start: 2022-02-07 | End: 2022-05-08

## 2022-02-07 RX ORDER — DIPHENHYDRAMINE HYDROCHLORIDE AND LIDOCAINE HYDROCHLORIDE AND ALUMINUM HYDROXIDE AND MAGNESIUM HYDRO
10 KIT EVERY 6 HOURS
Qty: 1200 ML | Refills: 0 | Status: SHIPPED | OUTPATIENT
Start: 2022-02-07 | End: 2022-03-09

## 2022-02-07 RX ORDER — MECLIZINE HYDROCHLORIDE 25 MG/1
25 TABLET ORAL 3 TIMES DAILY
Qty: 90 TABLET | Refills: 2 | Status: SHIPPED | OUTPATIENT
Start: 2022-02-07 | End: 2022-05-08

## 2022-02-07 RX ORDER — BENZONATATE 100 MG/1
100 CAPSULE ORAL 3 TIMES DAILY PRN
Qty: 30 CAPSULE | Refills: 0
Start: 2022-02-07 | End: 2022-02-17

## 2022-02-07 RX ORDER — AMOXICILLIN 250 MG
2 CAPSULE ORAL 2 TIMES DAILY
Qty: 120 TABLET | Refills: 2 | Status: SHIPPED | OUTPATIENT
Start: 2022-02-07 | End: 2022-05-08

## 2022-02-07 RX ORDER — MIDODRINE HYDROCHLORIDE 10 MG/1
10 TABLET ORAL
Qty: 90 TABLET | Refills: 2 | Status: SHIPPED | OUTPATIENT
Start: 2022-02-07 | End: 2022-05-08

## 2022-02-07 RX ORDER — BISACODYL 10 MG
10 SUPPOSITORY, RECTAL RECTAL DAILY PRN
Qty: 90 SUPPOSITORY | Refills: 0 | Status: SHIPPED | OUTPATIENT
Start: 2022-02-07 | End: 2022-05-08

## 2022-02-07 RX ORDER — DEXAMETHASONE 4 MG/1
TABLET ORAL
Qty: 15 TABLET | Refills: 0 | Status: SHIPPED | OUTPATIENT
Start: 2022-02-07

## 2022-02-07 RX ORDER — LIDOCAINE HYDROCHLORIDE 20 MG/ML
5 SOLUTION OROPHARYNGEAL
Qty: 100 ML | Refills: 0 | Status: SHIPPED | OUTPATIENT
Start: 2022-02-07 | End: 2022-02-17

## 2022-02-07 RX ORDER — POLYETHYLENE GLYCOL 3350 17 G/17G
17 POWDER, FOR SOLUTION ORAL DAILY PRN
Qty: 90 PACKET | Refills: 0 | Status: SHIPPED | OUTPATIENT
Start: 2022-02-07 | End: 2022-05-08

## 2022-02-07 RX ADMIN — MIDODRINE HYDROCHLORIDE 10 MG: 5 TABLET ORAL at 06:01

## 2022-02-07 RX ADMIN — DEXAMETHASONE 4 MG: 4 TABLET ORAL at 08:55

## 2022-02-07 RX ADMIN — MECLIZINE HYDROCHLORIDE 25 MG: 25 TABLET ORAL at 08:55

## 2022-02-07 RX ADMIN — ENOXAPARIN SODIUM 60 MG: 60 INJECTION SUBCUTANEOUS at 02:19

## 2022-02-07 RX ADMIN — PANTOPRAZOLE SODIUM 40 MG: 40 TABLET, DELAYED RELEASE ORAL at 06:01

## 2022-02-07 RX ADMIN — MIDODRINE HYDROCHLORIDE 10 MG: 5 TABLET ORAL at 11:14

## 2022-02-07 RX ADMIN — DIPHENHYDRAMINE HYDROCHLORIDE AND LIDOCAINE HYDROCHLORIDE AND ALUMINUM HYDROXIDE AND MAGNESIUM HYDRO 10 ML: KIT at 11:14

## 2022-02-07 RX ADMIN — SODIUM CHLORIDE, PRESERVATIVE FREE 10 ML: 5 INJECTION INTRAVENOUS at 08:55

## 2022-02-07 NOTE — CASE MANAGEMENT/SOCIAL WORK
Continued Stay Note  KIRILL Amador     Patient Name: Nila Jin  MRN: [id number]  Today's Date: 2/7/2022    Admit Date: 1/17/2022     Discharge Plan     Row Name 02/07/22 1244       Plan    Plan DC Plan: Anticipate routine home with family and [facility]  (accepted). New home O2 and wheelchair ordered through [facility].    Plan Comments Per walking oximetry results performed today, patient qualified for 3L home O2. CM contacted [facility] to request O2 order, [facility] confirmed once placed. CM updated [facility] liaison Carolina that patient scheduled to dc home today and would need 3L. [facility] that patient scheduled to dc home. LOLLY updated RN of dc plans.              Expected Discharge Date and Time     Expected Discharge Date Expected Discharge Time    Feb 7, 2022         Phone communication or documentation only - no physical contact with patient or family.    Berna Romano RN     Office Phone: [phone]  Office Cell: [phone]

## 2022-02-07 NOTE — DISCHARGE SUMMARY
[facility] Medicine Services  DISCHARGE SUMMARY    Patient Name: Nila Jin  : 1945  MRN: [id number]    Date of Admission: 2022  Discharge Diagnosis: Acute hypoxemic respiratory failure due to Covid 19 virus infection.  Date of Discharge: 2022.  Primary Care Physician: Baudilio Roldan IV, MD      Presenting Problem:   Dehydration [E86.0]  Vertigo [R42]  Hypoxia [R09.02]  Urinary tract infection without hematuria, site unspecified [N39.0]  Pneumonia due to COVID-19 virus [U07.1, J12.82]    Active and Resolved Hospital Problems:  Active Hospital Problems    Diagnosis POA   • **Acute hypoxemic respiratory failure due to COVID-19 (HCC) [U07.1, J96.01] Yes     Priority: High   • Pneumonia due to COVID-19 virus [U07.1, J12.82] Yes     Priority: High   • Acute UTI (urinary tract infection) [N39.0] Yes     Priority: High   • Cytokine release syndrome, grade 1 [D89.831] No     Priority: Medium   • Mixed hyperlipidemia [E78.2] Yes   • Adjustment disorder with depressed mood [F43.21] Yes   • Dizziness [R42] Yes   • Dehydration [E86.0] Yes   • IBS (irritable bowel syndrome) [K58.9] Yes   • GERD without esophagitis [K21.9] Yes   • Tobacco use disorder [F17.200] Yes   • Myeloproliferative disorder (HCC) [D47.1] Yes   • Primary osteoarthritis involving multiple joints [M89.49] Yes      Resolved Hospital Problems   No resolved problems to display.         Hospital Course     Hospital Course:  Patient is a 76 y.o. female with a history of myeloproliferative disorder, primary insomnia, IBS, generalized anxiety disorder, hyperlipidemia, tobacco use disorder, myeloproliferative disorder, primary insomnia, primary osteoarthritis and chronic nonseasonal allergic rhinitis due to pollen and GERD who presented to the ER at [facility] on 2022 complaining of dizziness. The patient states she felt like she might pass out. She denies any chest pain. She reports some mild  shortness of breath and a nonproductive cough. She states she initially started feeling bad 2-3 weeks ago. She reports fatigue, nausea, and some loose stools. She denies any exacerbating or alleviating factors. She has not been vaccinated for Covid. She does not wear home oxygen. She states she smokes 1 ppd, but hasn't smoked for the past week because she felt so bad.   Patient was seen in the ER and work-up revealed Covid pneumonia, UTI, and dehydration. The patient was given IV fluids and ceftriaxone. She was also given Zofran and meclizine. Her oxygen saturation was initially in the mid 90s on room air, but then she started dropping down so she was placed on supplemental oxygen with improvement. She was admitted to the Hospitalist group for further treatment.   Acute hypoxemic respiratory failure due to COVID-19 virus pneumonia was treated with oxygen therapy.  Acute UTI was treated with antibiotics.  Pneumonia due to Covid virus was treated with remdesivir.  Dizziness was treated with meclizine.  GERD was treated with Protonix.  Tobacco cessation counseling was completed because of her tobacco use disorder.  Patient declined the placement of a nicotine patch.  Dehydration was treated with IV fluids.  Appropriate patient's home medications were resumed in the hospital for other chronic medical conditions.  Patient reported significant improvement in her symptoms after over 21 days in the hospital and requested to be discharged home with home oxygen.  Patient was advised to take her medications as prescribed.  Discharge medications are as per medication reconciliation list.  Patient was advised to follow-up with her primary care physician within 3 to 5 days of discharge.  Patient was advised to follow-up with her pulmonologist within 14 days of discharge.  Patient was advised to return to the emergency department if she experiences any recurrence of her symptoms.  Patient and family agreed with the plan and  patient was discharged in stable condition.      DISCHARGE Follow Up Recommendations for labs and diagnostics:     Patient was advised to follow-up with her primary care physician who will review her current medications.    Patient was advised to follow-up with her pulmonologist who will reassess her pulmonary function.      Reasons For Change In Medications and Indications for New Medications:      Day of Discharge     Vital Signs:  Temp:  [97.3 °F (36.3 °C)-98.5 °F (36.9 °C)] 97.7 °F (36.5 °C)  Heart Rate:  [66-92] 80  Resp:  [16-20] 18  BP: (100-134)/(53-68) 110/53  Flow (L/min):  [2-3] 2    Physical Exam:  Physical Exam  Vitals and nursing note reviewed.   Constitutional:       General: She is not in acute distress.  HENT:      Head: Normocephalic and atraumatic.      Nose: Nose normal. No congestion or rhinorrhea.      Mouth/Throat:      Mouth: Mucous membranes are moist.      Pharynx: Oropharynx is clear. No oropharyngeal exudate or posterior oropharyngeal erythema.   Eyes:      Pupils: Pupils are equal, round, and reactive to light.   Cardiovascular:      Pulses: Normal pulses.      Heart sounds: Normal heart sounds. No murmur heard.  No friction rub. No gallop.       Comments: S1-S2 present. No tachycardia.  Pulmonary:      Effort: No respiratory distress.      Breath sounds: No wheezing, rhonchi or rales.   Chest:      Chest wall: No tenderness.   Abdominal:      General: Abdomen is flat. Bowel sounds are normal. There is no distension.      Palpations: Abdomen is soft.      Tenderness: There is no right CVA tenderness.   Musculoskeletal:         General: No swelling, tenderness, deformity or signs of injury.      Cervical back: Neck supple. No tenderness.      Right lower leg: No edema.      Left lower leg: No edema.   Skin:     Capillary Refill: Capillary refill takes less than 2 seconds.      Coloration: Skin is not jaundiced.      Findings: No bruising, lesion or rash.   Neurological:      Mental  Status: She is alert.      Comments: No facial asymmetry noted.   Gait and station not tested.   Psychiatric:      Comments: No agitation.              Pertinent  and/or Most Recent Results     LAB RESULTS:      Lab 02/05/22  0503 02/04/22 0408 02/03/22 0413 02/02/22 0452 02/01/22 0314   WBC 10.40 11.10* 9.00 9.70 9.30   HEMOGLOBIN 12.2 11.4* 11.5* 11.3* 11.5*   HEMATOCRIT 35.8 34.1 34.5 33.8* 34.7   PLATELETS 503* 557* 567* 584* 614*   NEUTROS ABS 6.20 7.30* 5.50 6.80 6.30   LYMPHS ABS 2.90 2.50 2.20 1.50 1.50   MONOS ABS 1.20* 1.20* 1.30* 1.40* 1.30*   EOS ABS 0.00 0.00 0.00 0.00 0.00   MCV 87.1 85.7 86.1 85.9 85.7   SED RATE 53* 52* 50* 45* 46*         Lab 02/07/22  0434 02/06/22  0512 02/05/22  0503 02/04/22 0408 02/03/22 0413 02/02/22 0452 02/02/22 0452 02/01/22 0314 02/01/22 0314   SODIUM  --   --  141 144 139  --  139  --  139   POTASSIUM 4.3 4.3 4.2 4.4 4.3   < > 4.5   < > 4.3   CHLORIDE  --   --  104 108* 105  --  105  --  103   CO2  --   --  29.0 27.0 26.0  --  25.0  --  28.0   ANION GAP  --   --  8.0 9.0 8.0  --  9.0  --  8.0   BUN  --   --  14 14 13  --  13  --  15   CREATININE  --   --  0.51* 0.50* 0.51*  --  0.55*  --  0.60   GLUCOSE  --   --  81 88 114*  --  98  --  83   CALCIUM  --   --  8.9 8.9 8.8  --  8.5*  --  8.5*   MAGNESIUM 1.9 1.9 2.3 2.5* 2.0   < > 1.9   < > 2.1    < > = values in this interval not displayed.         Lab 02/05/22  0503 02/04/22  0408 02/03/22  0413 02/02/22  0452 02/01/22  0314   TOTAL PROTEIN 6.3 6.1 5.8* 5.4* 5.5*   ALBUMIN 3.00* 2.80* 2.60* 2.60* 2.50*   GLOBULIN 3.3 3.3 3.2 2.8 3.0   ALT (SGPT) 54* 60* 46* 52* 71*   AST (SGOT) 22 27 24 21 25   BILIRUBIN 0.4 0.3 0.4 0.4 0.6   ALK PHOS 116 124* 123* 117 135*                     Brief Urine Lab Results  (Last result in the past 365 days)      Color   Clarity   Blood   Leuk Est   Nitrite   Protein   CREAT   Urine HCG        01/22/22 1816 Yellow   Clear   Moderate (2+)   Negative   Negative   Negative                Microbiology Results (last 10 days)     ** No results found for the last 240 hours. **          CT Head Without Contrast    Result Date: 1/17/2022  Impression: No acute intracranial abnormality.  Electronically Signed By-Tyler Montalvo On:1/17/2022 9:00 AM This report was finalized on [id number] by  Tyler Montalvo, .    XR Chest 1 View    Result Date: 1/31/2022  Impression: Patchy airspace disease in the left lung base consistent with pneumonia.  Electronically Signed By-Reddy Marquez MD On:1/31/2022 8:53 AM This report was finalized on [id number] by  Reddy Marquez MD.    XR Chest 1 View    Result Date: 1/27/2022  Impression: Multifocal patchy airspace disease throughout the lungs most concerning for pneumonia similar to prior study.  Electronically Signed By-Riki Foley MD On:1/27/2022 9:55 AM This report was finalized on [id number] by  Riki Foley MD.    XR Chest 1 View    Result Date: 1/22/2022  Impression:  Multifocal pneumonia has developed over the past 5 days.  Electronically Signed By-Ceferino Garsia On:1/22/2022 9:25 AM This report was finalized on [id number] by  Ceferino Garsia, .    XR Chest 1 View    Result Date: 1/17/2022  Impression: Hazy opacities within left lung, concerning for pneumonia. Recommend follow-up CXR in 4-6 weeks to document resolution.  Electronically Signed By-Benjie Mariee MD On:1/17/2022 9:51 AM This report was finalized on [id number] by  Benjie Mariee MD.    CT Chest Pulmonary Embolism    Result Date: 1/17/2022  Impression: Mild motion limitation. Given this limitation there is no evidence of a pulmonary embolus  Dependent and perihilar opacities are accentuated by low lung volumes. Findings may represent atelectasis and/or pneumonia.  Mild hilar adenopathy favored to be reactive in nature  Recommend follow-up to document resolution in one to 3 months or as clinically indicated     Electronically Signed ByLow Montalvo On:1/17/2022 1:34 PM This report was  finalized on [id number] by  Tyler Montalvo, .      Results for orders placed during the hospital encounter of 01/17/22    Duplex Venous Lower Extremity - Bilateral CAR    Interpretation Summary  · Normal bilateral lower extremity venous duplex scan.  · Right saphenofemoral junction not visualized due to bandage.      Results for orders placed during the hospital encounter of 01/17/22    Duplex Venous Lower Extremity - Bilateral CAR    Interpretation Summary  · Normal bilateral lower extremity venous duplex scan.  · Right saphenofemoral junction not visualized due to bandage.      Results for orders placed during the hospital encounter of 01/17/22    Adult Transthoracic Echo Limited W/ Cont if Necessary Per Protocol    Interpretation Summary  · Left ventricular ejection fraction appears to be 61 - 65%.  · Technically very difficult study with limited views  · Valvular structure and function is not well evaluated      Labs Pending at Discharge:      Procedures Performed           Consults:   Consults     Date and Time Order Name Status Description    1/22/2022  3:56 PM Inpatient Infectious Diseases Consult      1/22/2022  8:45 AM Inpatient Pulmonology Consult Completed     1/21/2022 10:45 AM Inpatient Psychiatrist Consult Completed             Discharge Details        Discharge Medications      New Medications      Instructions Start Date   albuterol sulfate  (90 Base) MCG/ACT inhaler  Commonly known as: PROVENTIL HFA;VENTOLIN HFA;PROAIR HFA   2 puffs, Inhalation, Every 6 Hours PRN      benzonatate 100 MG capsule  Commonly known as: TESSALON   100 mg, Oral, 3 Times Daily PRN      bisacodyl 5 MG EC tablet  Commonly known as: DULCOLAX   5 mg, Oral, Daily PRN      bisacodyl 10 MG suppository  Commonly known as: DULCOLAX   10 mg, Rectal, Daily PRN      dexamethasone 4 MG tablet  Commonly known as: DECADRON   Take 2 tablets daily for 5 days and take 1 tablet daily for 5 days.      First Mouthwash (Magic  Mouthwash) suspension   10 mL, Swish & Spit, Every 6 Hours      Lidocaine Viscous HCl 2 % solution  Commonly known as: XYLOCAINE   5 mL, Oral, Every 3 Hours PRN      meclizine 25 MG tablet  Commonly known as: ANTIVERT   25 mg, Oral, 3 Times Daily      midodrine 10 MG tablet  Commonly known as: PROAMATINE   10 mg, Oral, 3 Times Daily Before Meals      mirtazapine 15 MG tablet  Commonly known as: REMERON   15 mg, Oral, Nightly      polyethylene glycol 17 g packet  Commonly known as: MIRALAX   17 g, Oral, Daily PRN      sennosides-docusate 8.6-50 MG per tablet  Commonly known as: PERICOLACE   2 tablets, Oral, 2 Times Daily         Continue These Medications      Instructions Start Date   aspirin 325 MG tablet   325 mg, Oral, Daily      omeprazole 40 MG capsule  Commonly known as: priLOSEC   40 mg, Oral, Daily             Allergies   Allergen Reactions   • Erythromycin Other (See Comments)     Blood in stool   • Tetracycline Nausea Only         Discharge Disposition: Stable.  Home or Self Care    Diet:  Hospital:  Diet Order   Procedures   • Diet Regular         Discharge Activity: As tolerated.        CODE STATUS:  Code Status and Medical Interventions:   Ordered at: 01/17/22 1058     Code Status (Patient has no pulse and is not breathing):    CPR (Attempt to Resuscitate)     Medical Interventions (Patient has pulse or is breathing):    Full Support         No future appointments.        Time spent on Discharge including face to face service: 40  minutes    This patient has been examined wearing appropriate Personal Protective Equipment and discussed with hospital infection control department, [facility], infectious disease specialist and pulmonologist. 02/07/22      Signature:Electronically signed by Trevon Watson MD, FACP, 02/07/22, 12:49 PM EST.

## 2022-02-07 NOTE — PROGRESS NOTES
Exercise Oximetry    Patient Name:Nila Jin   MRN: [id number]   Date: 02/07/22             ROOM AIR BASELINE   SpO2% 92%   Heart Rate 86   Blood Pressure      EXERCISE ON ROOM AIR SpO2% EXERCISE ON O2 @ 3 LPM SpO2%   1 MINUTE 90% 1 MINUTE    2 MINUTES 87% 2 MINUTES    3 MINUTES 86% 3 MINUTES    4 MINUTES  4 MINUTES 89%   5 MINUTES  5 MINUTES 92%   6 MINUTES  6 MINUTES 93%              Distance Walked   Distance Walked Around P3 6 Minutes   Dyspnea (Dandy Scale)   Dyspnea (Dandy Scale)   Fatigue (Dandy Scale)   Fatigue (Dandy Scale)   SpO2% Post Exercise   SpO2% Post Exercise 92%   HR Post Exercise   HR Post Exercise 99   Time to Recovery   Time to Recovery 1 Minute      Comments: Patient Requires 3 LPM Home Oxygen. Candelario Christianson, CRT

## 2022-02-07 NOTE — PLAN OF CARE
Goal Outcome Evaluation:   Pt and family educated on importance of adherence to careplan.  Understands need for home O2 and PT.

## 2022-02-07 NOTE — CASE MANAGEMENT/SOCIAL WORK
Continued Stay Note  KIRILL Amador     Patient Name: Nila Jin  MRN: [id number]  Today's Date: 2/7/2022    Admit Date: 1/17/2022     Discharge Plan     Row Name 02/07/22 1609       Plan    Plan Comments Donis Figueroa confirmed that O2 was delivered to room and the rest of the O2 equipment and wheelchair will be delivered to patient's home once they receive call from patient that patient is home.    Final Discharge Disposition Code 06 - home with home health care  [facility]              Expected Discharge Date and Time     Expected Discharge Date Expected Discharge Time    Feb 7, 2022         Phone communication or documentation only - no physical contact with patient or family.    Berna Romano RN     Office Phone: [phone]  Office Cell: [phone]

## 2022-02-07 NOTE — PROGRESS NOTES
"PULMONARY CRITICAL CARE Progress  NOTE      PATIENT IDENTIFICATION:  Name: Nila Jin  MRN: [id number]  :  1945     Age: 76 y.o.  Sex: female    DATE OF Note:  2022   Referring Physician: Trevon Watson MD                  Subjective:   Feeling better,on 2 L O2,  no SOB,  no chest or abd pain, no bowel or bladder issues     Objective:  tMax 24 hrs: Temp (24hrs), Av.7 °F (36.5 °C), Min:97.3 °F (36.3 °C), Max:98.5 °F (36.9 °C)      Vitals Ranges:   Temp:  [97.3 °F (36.3 °C)-98.5 °F (36.9 °C)] 97.7 °F (36.5 °C)  Heart Rate:  [66-92] 80  Resp:  [16-20] 18  BP: (100-134)/(53-68) 110/53    Intake and Output Last 3 Shifts:   I/O last 3 completed shifts:  In: 960 [P.O.:960]  Out: 1250 [Urine:1250]    Exam:  /53   Pulse 80   Temp 97.7 °F (36.5 °C) (Oral)   Resp 18   Ht 170.2 cm (67\")   Wt 62.9 kg (138 lb 10.7 oz)   SpO2 93%   BMI 21.72 kg/m²     General Appearance:     HEENT:  Normocephalic, without obvious abnormality, Conjunctiva/corneas clear,.  Normal external ear canals, Nares normal, no drainage     Neck:  Supple, symmetrical, trachea midline. No JVD.  Lungs /Chest wall:   Bilateral basal rhonchi, respirations unlabored symmetrical wall movement.     Heart:  Regular rate and rhythm, systolic murmur PMI left sternal border  Abdomen: Soft, non-tender, no masses, no organomegaly.    Extremities: Trace edema no clubbing or Cyanosis        Medications:    Current Facility-Administered Medications:     acetaminophen (TYLENOL) tablet 650 mg, 650 mg, Oral, Q4H PRN, 650 mg at 22 8721 **OR** acetaminophen (TYLENOL) 160 MG/5ML solution 650 mg, 650 mg, Oral, Q4H PRN **OR** acetaminophen (TYLENOL) suppository 650 mg, 650 mg, Rectal, Q4H PRN, Rajendra Duncan MD    albuterol sulfate HFA (PROVENTIL HFA;VENTOLIN HFA;PROAIR HFA) inhaler 2 puff, 2 puff, Inhalation, Q6H PRN, Rajendra Duncan MD    aluminum-magnesium hydroxide-simethicone (MAALOX MAX) 400-400-40 MG/5ML suspension 15 mL, 15 mL, Oral, " Q6H PRN, Rajendra Duncan MD, 15 mL at 01/31/22 1640    benzonatate (TESSALON) capsule 100 mg, 100 mg, Oral, TID PRN, Rajendra Duncan MD, 100 mg at 01/21/22 0542    sennosides-docusate (PERICOLACE) 8.6-50 MG per tablet 2 tablet, 2 tablet, Oral, BID, 2 tablet at 02/06/22 2059 **AND** polyethylene glycol (MIRALAX) packet 17 g, 17 g, Oral, Daily PRN **AND** bisacodyl (DULCOLAX) EC tablet 5 mg, 5 mg, Oral, Daily PRN **AND** bisacodyl (DULCOLAX) suppository 10 mg, 10 mg, Rectal, Daily PRN, Rajendra Duncan MD    dexamethasone (DECADRON) tablet 4 mg, 4 mg, Oral, Daily With Breakfast, 4 mg at 02/07/22 0855 **OR** [DISCONTINUED] dexamethasone (DECADRON) injection 6 mg, 6 mg, Intravenous, Daily, Ellyn Lim, APRN, 6 mg at 01/17/22 1132    enoxaparin (LOVENOX) syringe 60 mg, 60 mg, Subcutaneous, Q12H, Joan Maria MD, 60 mg at 02/07/22 0219    First Mouthwash (Magic Mouthwash) 10 mL, 10 mL, Swish & Spit, Q6H, Rajendra Duncan MD, 10 mL at 02/07/22 1114    Lidocaine Viscous HCl (XYLOCAINE) 2 % solution 5 mL, 5 mL, Mouth/Throat, Q3H PRN, Trevon Watson MD, 5 mL at 01/28/22 1456    Magnesium Sulfate 2 gram infusion - Mg less than or equal to 1.5 mg/dL, 2 g, Intravenous, PRN **OR** Magnesium Sulfate 1 gram infusion - Mg 1.6-1.9 mg/dL, 1 g, Intravenous, PRN, Rajendra Duncan MD, Last Rate: 100 mL/hr at 01/22/22 0837, 1 g at 01/22/22 0837    meclizine (ANTIVERT) tablet 25 mg, 25 mg, Oral, BID, Trevon Watson MD, 25 mg at 02/07/22 0855    melatonin tablet 5 mg, 5 mg, Oral, Nightly PRN, Rajendra Duncan MD, 5 mg at 02/06/22 2126    midodrine (PROAMATINE) tablet 10 mg, 10 mg, Oral, TID AC, Rajendra Duncan MD, 10 mg at 02/07/22 1114    mirtazapine (REMERON) tablet 15 mg, 15 mg, Oral, Nightly, Rajendra Duncan MD, 15 mg at 02/06/22 2059    nitroglycerin (NITROSTAT) SL tablet 0.4 mg, 0.4 mg, Sublingual, Q5 Min PRN, Rajendra Duncan MD    ondansetron (ZOFRAN) tablet 4 mg, 4 mg, Oral, Q6H PRN, 4 mg at 01/22/22 7158 **OR** ondansetron (ZOFRAN) injection  4 mg, 4 mg, Intravenous, Q6H PRN, Rajendra Duncan MD, 4 mg at 01/22/22 1734    pantoprazole (PROTONIX) EC tablet 40 mg, 40 mg, Oral, QAMDakota Viktor, MD, 40 mg at 02/07/22 0601    potassium chloride (K-DUR,KLOR-CON) CR tablet 40 mEq, 40 mEq, Oral, PRN, Rajendra Duncan MD    potassium chloride (KLOR-CON) packet 40 mEq, 40 mEq, Oral, PRN, Rajendra Duncan MD    potassium phosphate 45 mmol in sodium chloride 0.9 % 500 mL infusion, 45 mmol, Intravenous, PRN **OR** potassium phosphate 30 mmol in sodium chloride 0.9 % 250 mL infusion, 30 mmol, Intravenous, PRN **OR** potassium phosphate 15 mmol in 0.9% sodium chloride 100 mL IVPB, 15 mmol, Intravenous, PRN, 15 mmol at 01/20/22 1039 **OR** sodium phosphates 45 mmol in sodium chloride 0.9 % 500 mL IVPB, 45 mmol, Intravenous, PRN **OR** sodium phosphates 30 mmol in sodium chloride 0.9 % 250 mL IVPB, 30 mmol, Intravenous, PRN **OR** sodium phosphates 15 mmol in sodium chloride 0.9 % 250 mL IVPB, 15 mmol, Intravenous, PRGERONIMO, Rajendra Duncan MD    sodium chloride 0.9 % flush 10 mL, 10 mL, Intravenous, PRN, Rajendra Duncan MD    sodium chloride 0.9 % flush 10 mL, 10 mL, Intravenous, Q12H, Rajendra Duncan MD, 10 mL at 02/07/22 0855    sodium chloride 0.9 % flush 10 mL, 10 mL, Intravenous, PRN, Rajendra Duncan MD    sodium chloride nasal spray 1 spray, 1 spray, Each Nare, KEARA, Trevon Watson MD, 1 spray at 01/27/22 1810    Data Review:  All labs (24hrs):   Recent Results (from the past 24 hour(s))   Potassium    Collection Time: 02/07/22  4:34 AM    Specimen: Blood   Result Value Ref Range    Potassium 4.3 3.5 - 5.2 mmol/L   Magnesium    Collection Time: 02/07/22  4:34 AM    Specimen: Blood   Result Value Ref Range    Magnesium 1.9 1.6 - 2.4 mg/dL        Imaging:  XR Chest 1 View  Narrative: DATE OF EXAM:  1/31/2022 8:43 AM     PROCEDURE:  XR CHEST 1 VW-     INDICATIONS:  COVID-19 pneumonia; U07.1-COVID-19; J12.82-Pneumonia due to coronavirus  disease 2019; E86.0-Dehydration; R42-Dizziness  and giddiness;  N39.0-Urinary tract infection, site not specified; R09.02-Hypoxemia     COMPARISON:  01/27/2022     TECHNIQUE:   Single radiographic AP view of the chest was obtained.     FINDINGS:  Cardiac size remains normal. There is patchy airspace disease in the  left lung base. The vascular markings in the chest appear normal. No  pleural fluid is seen.      Impression: Patchy airspace disease in the left lung base consistent with pneumonia.     Electronically Signed By-Reddy Marquez MD On:1/31/2022 8:53 AM  This report was finalized on [id number] by  Reddy Maqruez MD.       ASSESSMENT:    Acute hypoxemic respiratory failure due to COVID-19 (HCC)    Pneumonia due to COVID-19 virus    Dizziness    Acute UTI (urinary tract infection)    Dehydration    GERD without esophagitis    Tobacco use disorder    IBS (irritable bowel syndrome)    Myeloproliferative disorder (HCC)    Primary osteoarthritis involving multiple joints    Adjustment disorder with depressed mood    Mixed hyperlipidemia    Cytokine release syndrome, grade 1         PLAN:  Discharge home and follow up in 4 weeks  6 minute walk completed and needs 3 L for home   Antibiotics  Bronchodilator  Inhaled corticosteroids  Dexamethasone  Electrolytes/ glycemic control  DVT and GI prophylaxis-Lovenox    Discussed with Dr Amy Barrera, APRN   2/7/2022  15:11 EST     I personally have examined  and interviewed the patient. I have reviewed the history, data, problems, assessment and plan with our NP.  Total Critical care time in direct medical management (   ) minutes, This time specifically excludes time spent performing procedures.    Aixa Reyes MD   2/7/2022  20:59 EST

## 2022-02-08 ENCOUNTER — READMISSION MANAGEMENT (OUTPATIENT)
Dept: CALL CENTER | Facility: HOSPITAL | Age: 77
End: 2022-02-08

## 2022-02-08 NOTE — OUTREACH NOTE
Prep Survey      Responses   Rest[facility] facility patient discharged from? [facility]   Is LACE score < 7 ? No   Emergency Room discharge w/ pulse ox? No   Eligibility Readm Mgmt   Discharge diagnosis Acute hypoxemic respiratory failure due to COVID  Urinary tract infection    Does the patient have one of the following disease processes/diagnoses(primary or secondary)? COVID-19   Does the patient have Home health ordered? Yes   What is the Home health agency?  [facility]     Is there a DME ordered? Yes   What DME was ordered? New home O2 and wheelchair ordered through [facility]   Prep survey completed? Yes          Rosio Augustin RN

## 2022-02-08 NOTE — OUTREACH NOTE
COVID-19 Week 1 Survey      Responses   [facility] patient discharged from? [facility]   Does the patient have one of the following disease processes/diagnoses(primary or secondary)? COVID-19   COVID-19 underlying condition? None   Call Number Call 1   Week 1 Call successful? Yes   Call start time 1121   Call end time 1130   Discharge diagnosis Acute hypoxemic respiratory failure due to COVID  Urinary tract infection    Is patient permission given to speak with other caregiver? Yes   List who call center can speak with    Person spoke with today (if not patient) and relationship    Meds reviewed with patient/caregiver? Yes   Is the patient having any side effects they believe may be caused by any medication additions or changes? No   Does the patient have all medications ordered at discharge? No   What is keeping the patient from filling the prescriptions? Financial  [the Magic Mouthwash was not covered by ins, the cost was $550. They can't afford this cost. Pt denies issues with mouth at this time per ]   Is the patient taking all medications as directed (includes completed medication regime)? No   What is preventing the patient from taking all medications as directed? Other   Nursing Interventions Nurse provided patient education   Does the patient have a primary care provider?  Yes   Does the patient have an appointment with their PCP or specialist within 7 days of discharge? No   What is preventing the patient from scheduling follow up appointments within 7 days of discharge? Haven't had time   Nursing Interventions Educated patient on importance of making appointment   Has the patient kept scheduled appointments due by today? N/A   What is the Home health agency?  [facility]     Has home health visited the patient within 72 hours of discharge? Call prior to 72 hours   What DME was ordered? New home O2 and wheelchair ordered through [place]   Has all DME been delivered? Yes   DME comments  Wearing continuously @3L    Psychosocial issues? No   Comments Pt walked around house, has had BM today per .    Did the patient receive a copy of their discharge instructions? Yes   Did the patient receive a copy of COVID-19 specific instructions? Yes   Nursing interventions Reviewed instructions with patient   What is the patient's perception of their health status since discharge? Improving   Does the patient have any of the following symptoms? None   Nursing Interventions Nurse provided patient education   Pulse Ox monitoring None  [planning to purchase one.]   Is the patient/caregiver able to teach back steps to recovery at home? Set small, achievable goals for return to baseline health   If the patient is a current smoker, are they able to teach back resources for cessation? Not a smoker  [quit upon admission]   Is the patient/caregiver able to teach back the hierarchy of who to call/visit for symptoms/problems? PCP, Specialist, Home health nurse, Urgent Care, ED, 911 Yes   COVID-19 call completed? Yes          Monalisa Lazar RN

## 2022-02-09 ENCOUNTER — READMISSION MANAGEMENT (OUTPATIENT)
Dept: CALL CENTER | Facility: HOSPITAL | Age: 77
End: 2022-02-09

## 2022-02-09 NOTE — OUTREACH NOTE
COVID-19 Week 1 Survey      Responses   [facility] [place] patient discharged from? [facility]   Does the patient have one of the following disease processes/diagnoses(primary or secondary)? COVID-19   COVID-19 underlying condition? None   Call Number Call 2   Week 1 Call successful? Yes   Call start time 1102   Call end time 1105   Discharge diagnosis Acute hypoxemic respiratory failure due to COVID  Urinary tract infection    Is patient permission given to speak with other caregiver? Yes   List who call center can speak with    Person spoke with today (if not patient) and relationship    Medication comments No issues with taking meds. Did not purchase Magic Mouthwash as insurance did not cover this.    Has the patient kept scheduled appointments due by today? N/A   What is the Home health agency?  [facility]     Home health comments PT coming in the home   Psychosocial issues? No   Pulse Ox monitoring None  [Will get one soon ]   Is the patient/caregiver able to teach back the hierarchy of who to call/visit for symptoms/problems? PCP, Specialist, Home health nurse, Urgent Care, ED, 911 Yes   COVID-19 call completed? Yes          Vanessa Tran RN

## 2022-02-10 ENCOUNTER — READMISSION MANAGEMENT (OUTPATIENT)
Dept: CALL CENTER | Facility: HOSPITAL | Age: 77
End: 2022-02-10

## 2022-02-10 NOTE — OUTREACH NOTE
COVID-19 Week 1 Survey      Responses   [facility] patient discharged from? [facility]   Does the patient have one of the following disease processes/diagnoses(primary or secondary)? COVID-19   COVID-19 underlying condition? None   Call Number Call 3   Week 1 Call successful? Yes   Call start time 1202   Call end time 1206   Discharge diagnosis Acute hypoxemic respiratory failure due to COVID  Urinary tract infection    Person spoke with today (if not patient) and relationship    Meds reviewed with patient/caregiver? Yes   Is the patient having any side effects they believe may be caused by any medication additions or changes? No   Does the patient have all medications ordered at discharge? Yes   Is the patient taking all medications as directed (includes completed medication regime)? Yes   Does the patient have a primary care provider?  Yes   Does the patient have an appointment with their PCP or specialist within 7 days of discharge? No   What is preventing the patient from scheduling follow up appointments within 7 days of discharge? Haven't had time   Nursing Interventions Advised patient to make appointment   Has the patient kept scheduled appointments due by today? N/A   What is the Home health agency?  [facility]    Psychosocial issues? No   What is the patient's perception of their health status since discharge? Improving   Does the patient have any of the following symptoms? Shortness of breath,  Cough  [Weak]   Nursing Interventions Nurse provided patient education   Pulse Ox monitoring Intermittent   O2 Sat comments 95% on RA   O2 Sat: education provided Sat levels,  When to seek care,  Monitoring frequency   O2 Sat education comments Advised pt if O2 levels drop to 89% or below and does not rebound with deep breathing and rest to seek emergency medical attention.   Is the patient/caregiver able to teach back steps to recovery at home? Rest and rebuild strength, gradually increase activity,   Eat a well-balance diet   COVID-19 call completed? Yes          Angelica Hendrickson RN

## 2022-02-14 ENCOUNTER — READMISSION MANAGEMENT (OUTPATIENT)
Dept: CALL CENTER | Facility: HOSPITAL | Age: 77
End: 2022-02-14

## 2022-02-14 NOTE — OUTREACH NOTE
COVID-19 Week 2 Survey      Responses   [facility] patient discharged from? [facility]   Does the patient have one of the following disease processes/diagnoses(primary or secondary)? COVID-19   COVID-19 underlying condition? None   Call Number Call 1   COVID-19 Week 2: Call 1 attempt successful? Yes   Call start time 1136   Call end time 1138   Discharge diagnosis Acute hypoxemic respiratory failure due to COVID  Urinary tract infection    Person spoke with today (if not patient) and relationship    Meds reviewed with patient/caregiver? Yes   Is the patient having any side effects they believe may be caused by any medication additions or changes? No   Does the patient have all medications ordered at discharge? Yes   Is the patient taking all medications as directed (includes completed medication regime)? Yes   Does the patient have a primary care provider?  Yes   Does the patient have an appointment with their PCP or specialist within 7 days of discharge? No   What is preventing the patient from scheduling follow up appointments within 7 days of discharge? Haven't had time   Nursing Interventions Advised patient to make appointment   Has the patient kept scheduled appointments due by today? N/A   What is the patient's perception of their health status since discharge? Improving   Does the patient have any of the following symptoms? None   Nursing Interventions Nurse provided patient education   Pulse Ox monitoring Intermittent   O2 Sat comments 92-98% on RA   Is the patient/caregiver able to teach back steps to recovery at home? Rest and rebuild strength, gradually increase activity,  Eat a well-balance diet   COVID-19 call completed? Yes          Angelica Hendrickson RN

## 2022-02-22 ENCOUNTER — READMISSION MANAGEMENT (OUTPATIENT)
Dept: CALL CENTER | Facility: HOSPITAL | Age: 77
End: 2022-02-22

## 2022-02-22 NOTE — OUTREACH NOTE
COVID-19 Week 3 Survey      Responses   [facility] patient discharged from? [facility]   Does the patient have one of the following disease processes/diagnoses(primary or secondary)? COVID-19   COVID-19 underlying condition? None   Call Number Call 1   COVID-19 Week 3: Call 1 attempt successful? Yes   Call start time 1605   Call end time 1610   Discharge diagnosis Acute hypoxemic respiratory failure due to COVID  Urinary tract infection    Is patient permission given to speak with other caregiver? Yes   List who call center can speak with AMELIA PEÑAHOUSTON   Person spoke with today (if not patient) and relationship AMELIA GRIDER-    Meds reviewed with patient/caregiver? Yes   Is the patient having any side effects they believe may be caused by any medication additions or changes? No   Does the patient have all medications ordered at discharge? Yes   Is the patient taking all medications as directed (includes completed medication regime)? Yes   Does the patient have a primary care provider?  Yes   Comments regarding PCP PCP APPOINTMENT IS 3/14/22   Does the patient have an appointment with their PCP or specialist within 7 days of discharge? Greater than 7 days   What is preventing the patient from scheduling follow up appointments within 7 days of discharge? Earlier appointment not available   Nursing Interventions Verified appointment date/time/provider   Has the patient kept scheduled appointments due by today? N/A   What is the Home health agency?  [facility]     Has home health visited the patient within 72 hours of discharge? Yes   What DME was ordered? New home O2 and wheelchair ordered through [place]   Has all DME been delivered? Yes   Psychosocial issues? No   Did the patient receive a copy of their discharge instructions? Yes   Did the patient receive a copy of COVID-19 specific instructions? Yes   Nursing interventions Reviewed instructions with patient   What is the patient's perception of their  health status since discharge? Improving   Does the patient have any of the following symptoms? None   Nursing Interventions Nurse provided patient education   Pulse Ox monitoring Intermittent   Pulse Ox device source Patient   O2 Sat: education provided Sat levels,  Monitoring frequency   Is the patient/caregiver able to teach back steps to recovery at home? Rest and rebuild strength, gradually increase activity,  Set small, achievable goals for return to baseline health   If the patient is a current smoker, are they able to teach back resources for cessation? Not a smoker   Is the patient/caregiver able to teach back the hierarchy of who to call/visit for symptoms/problems? PCP, Specialist, Home health nurse, Urgent Care, ED, 911 Yes   COVID-19 call completed? Yes          Naomi Kiser LPN